# Patient Record
Sex: FEMALE | Race: WHITE | NOT HISPANIC OR LATINO | Employment: FULL TIME | URBAN - METROPOLITAN AREA
[De-identification: names, ages, dates, MRNs, and addresses within clinical notes are randomized per-mention and may not be internally consistent; named-entity substitution may affect disease eponyms.]

---

## 2017-01-03 ENCOUNTER — OFFICE VISIT (OUTPATIENT)
Dept: INTERNAL MEDICINE | Facility: CLINIC | Age: 37
End: 2017-01-03
Payer: COMMERCIAL

## 2017-01-03 ENCOUNTER — LAB VISIT (OUTPATIENT)
Dept: LAB | Facility: HOSPITAL | Age: 37
End: 2017-01-03
Attending: INTERNAL MEDICINE
Payer: COMMERCIAL

## 2017-01-03 VITALS
DIASTOLIC BLOOD PRESSURE: 78 MMHG | HEART RATE: 99 BPM | TEMPERATURE: 99 F | RESPIRATION RATE: 18 BRPM | BODY MASS INDEX: 24.61 KG/M2 | HEIGHT: 65 IN | SYSTOLIC BLOOD PRESSURE: 112 MMHG | WEIGHT: 147.69 LBS

## 2017-01-03 DIAGNOSIS — J06.9 UPPER RESPIRATORY TRACT INFECTION, UNSPECIFIED TYPE: ICD-10-CM

## 2017-01-03 DIAGNOSIS — J06.9 UPPER RESPIRATORY TRACT INFECTION, UNSPECIFIED TYPE: Primary | ICD-10-CM

## 2017-01-03 LAB
BASOPHILS # BLD AUTO: 0.03 K/UL
BASOPHILS NFR BLD: 0.4 %
DIFFERENTIAL METHOD: ABNORMAL
EOSINOPHIL # BLD AUTO: 0.1 K/UL
EOSINOPHIL NFR BLD: 1 %
ERYTHROCYTE [DISTWIDTH] IN BLOOD BY AUTOMATED COUNT: 13 %
HCT VFR BLD AUTO: 40.9 %
HGB BLD-MCNC: 13.8 G/DL
LYMPHOCYTES # BLD AUTO: 1 K/UL
LYMPHOCYTES NFR BLD: 14.4 %
MCH RBC QN AUTO: 29.2 PG
MCHC RBC AUTO-ENTMCNC: 33.7 %
MCV RBC AUTO: 87 FL
MONOCYTES # BLD AUTO: 0.8 K/UL
MONOCYTES NFR BLD: 11 %
NEUTROPHILS # BLD AUTO: 5 K/UL
NEUTROPHILS NFR BLD: 73.2 %
PLATELET # BLD AUTO: 283 K/UL
PMV BLD AUTO: 10.8 FL
RBC # BLD AUTO: 4.72 M/UL
WBC # BLD AUTO: 6.81 K/UL

## 2017-01-03 PROCEDURE — 85025 COMPLETE CBC W/AUTO DIFF WBC: CPT

## 2017-01-03 PROCEDURE — 36415 COLL VENOUS BLD VENIPUNCTURE: CPT | Mod: PO

## 2017-01-03 PROCEDURE — 99202 OFFICE O/P NEW SF 15 MIN: CPT | Mod: S$GLB,,, | Performed by: INTERNAL MEDICINE

## 2017-01-03 PROCEDURE — 1159F MED LIST DOCD IN RCRD: CPT | Mod: S$GLB,,, | Performed by: INTERNAL MEDICINE

## 2017-01-03 PROCEDURE — 99999 PR PBB SHADOW E&M-NEW PATIENT-LVL III: CPT | Mod: PBBFAC,,, | Performed by: INTERNAL MEDICINE

## 2017-01-03 RX ORDER — AZATHIOPRINE 75 MG/1
1 TABLET ORAL DAILY
Refills: 2 | COMMUNITY
Start: 2016-12-22 | End: 2017-04-18

## 2017-01-03 RX ORDER — NORGESTIMATE AND ETHINYL ESTRADIOL 7DAYSX3 28
KIT ORAL
Refills: 2 | COMMUNITY
Start: 2016-12-22

## 2017-01-03 RX ORDER — AMOXICILLIN 875 MG/1
875 TABLET, FILM COATED ORAL 2 TIMES DAILY
Qty: 20 TABLET | Refills: 0 | Status: SHIPPED | OUTPATIENT
Start: 2017-01-03 | End: 2017-01-13

## 2017-01-03 NOTE — MR AVS SNAPSHOT
[place] [facility]   [facility]  [place] [address]  Phone: [phone]  Fax: [phone]                  Lynda Reveles   1/3/2017 3:00 PM   Office Visit    Description:  Female : 1980   Provider:  Candice Miles MD   Department:  [place] [facility]           Reason for Visit     Fever     Cough     Sore Throat     Generalized Body Aches           Diagnoses this Visit        Comments    Upper respiratory tract infection, unspecified type    -  Primary            To Do List           Future Appointments        Provider Department Dept Phone    2017 2:00 PM Candice Miles MD [place] [facility] [phone]      Goals (5 Years of Data)     None       These Medications        Disp Refills Start End    amoxicillin (AMOXIL) 875 MG tablet 20 tablet 0 1/3/2017 2017    Take 1 tablet (875 mg total) by mouth 2 (two) times daily. - Oral    Pharmacy: [facility]/pharmacy #[id number] - [place] - [address]  #: [phone]         [facility]     [facility] On Call Nurse Care Line -  Assistance  Registered nurses in the [facility] On Call Center provide clinical advisement, health education, appointment booking, and other advisory services.  Call for this free service at [phone].             Medications           Message regarding Medications     Verify the changes and/or additions to your medication regime listed below are the same as discussed with your clinician today.  If any of these changes or additions are incorrect, please notify your healthcare provider.        START taking these NEW medications        Refills    amoxicillin (AMOXIL) 875 MG tablet 0    Sig: Take 1 tablet (875 mg total) by mouth 2 (two) times daily.    Class: Normal    Route: Oral           Verify that the below list of medications is an accurate representation of the medications you are currently taking.  If none reported, the list may be blank. If  "incorrect, please contact your healthcare provider. Carry this list with you in case of emergency.           Current Medications     amoxicillin (AMOXIL) 875 MG tablet Take 1 tablet (875 mg total) by mouth 2 (two) times daily.    AZASAN 75 mg Tab Take 1 tablet by mouth once daily.    norgestimate-ethinyl estradiol (ORTHO TRI-CYCLEN,TRI-SPRINTEC) 0.18/0.215/0.25 mg-35 mcg (28) tablet TAKE 1 TABLET BY MOUTH EVERY DAY MUST MAKE DR APPOINTMENT           Clinical Reference Information           Vital Signs - Last Recorded  Most recent update: 1/3/2017  3:00 PM by Briana Connell MA    BP Pulse Temp Resp Ht Wt    112/78 (BP Location: Left arm, Patient Position: Sitting, BP Method: Manual) 99 98.6 °F (37 °C) (Oral) 18 5' 5" (1.651 m) 67 kg (147 lb 11.3 oz)    LMP BMI             12/28/2016 24.58 kg/m2         Blood Pressure          Most Recent Value    BP  112/78      Allergies as of 1/3/2017     Asacol [Mesalamine]    Ciprofloxacin    Morphine      Immunizations Administered on Date of Encounter - 1/3/2017     None      Orders Placed During Today's Visit     Future Labs/Procedures Expected by Expires    CBC auto differential  1/3/2017 3/4/2018      "

## 2017-01-03 NOTE — PROGRESS NOTES
CC: fever and bodyache  HPI:  The patient is a 36 y.o. year old female who presents to the office for fever and bodyaches.  She reports a mild sore throat, ear pain and rhinorrhea recently.  She reports fatigue over the last week, difficulty running.  She reports aching sensation of spot on right shoulder blade.  The patient complains of fever Sunday.  She has taken tylenol.  She denies any dysuria or hematuria.  She also reports cough started recently.    PAST MEDICAL HISTORY:  Past Medical History   Diagnosis Date    Crohn disease        SURGICAL HISTORY:  Past Surgical History   Procedure Laterality Date    Subtotal colectomy       with ileostomy reversal       MEDS:  Medcard reviewed and updated    ALLERGIES: Allergy Card reviewed and updated    SOCIAL HISTORY:   The patient is a nonsmoker.    PE:   APPEARANCE: Well nourished, well developed, in no acute distress.    EARS: TM's intact. No retraction or perforation.    NOSE: Mucosa pink. Airway clear.  NODES: Positive right submandibular lymph node enlargement.  MOUTH & THROAT: No tonsillar enlargement. No pharyngeal erythema or exudate. No stridor.  CHEST: Lungs clear to auscultation with unlabored respirations.  CARDIOVASCULAR: Normal S1, S2. No murmurs. No carotid bruits. No pedal edema.  ABDOMEN: Bowel sounds normal. Not distended. Soft. No tenderness or masses.   PSYCHIATRIC: The patient is oriented to person, place, and time and has a pleasant affect.        ASSESSMENT/PLAN:  Lynda was seen today for fever, cough, sore throat and generalized body aches.    Diagnoses and all orders for this visit:    Upper respiratory tract infection, unspecified type  -     CBC auto differential; Future  -     Prescribed amoxicillin    Other orders  -     amoxicillin (AMOXIL) 875 MG tablet; Take 1 tablet (875 mg total) by mouth 2 (two) times daily.

## 2017-01-03 NOTE — LETTER
January 3, 2017      [place] [facility]  [address] [facility] [address] [facility]  [place] [address]  Phone: [phone]  Fax: [phone]       Patient: Lynda Reveles  YOB: 1980  Date of Visit: 01/03/2017    To Whom It May Concern:    Lynda Reveles was at [facility] on 01/03/2017. She may return to work/school on 01/05/2017 with no restrictions. If you have any questions or concerns, or if I can be of further assistance, please do not hesitate to contact me.    Sincerely,    Candice Miles MD

## 2017-01-06 ENCOUNTER — PATIENT MESSAGE (OUTPATIENT)
Dept: INTERNAL MEDICINE | Facility: CLINIC | Age: 37
End: 2017-01-06

## 2017-04-18 ENCOUNTER — OFFICE VISIT (OUTPATIENT)
Dept: INTERNAL MEDICINE | Facility: CLINIC | Age: 37
End: 2017-04-18
Payer: COMMERCIAL

## 2017-04-18 ENCOUNTER — LAB VISIT (OUTPATIENT)
Dept: LAB | Facility: HOSPITAL | Age: 37
End: 2017-04-18
Attending: INTERNAL MEDICINE
Payer: COMMERCIAL

## 2017-04-18 VITALS
HEART RATE: 80 BPM | TEMPERATURE: 98 F | BODY MASS INDEX: 24.21 KG/M2 | DIASTOLIC BLOOD PRESSURE: 76 MMHG | WEIGHT: 145.31 LBS | RESPIRATION RATE: 16 BRPM | HEIGHT: 65 IN | SYSTOLIC BLOOD PRESSURE: 102 MMHG

## 2017-04-18 DIAGNOSIS — J32.9 RECURRENT SINUSITIS: Primary | ICD-10-CM

## 2017-04-18 DIAGNOSIS — Z00.00 ROUTINE MEDICAL EXAM: ICD-10-CM

## 2017-04-18 DIAGNOSIS — Z00.00 ROUTINE MEDICAL EXAM: Primary | ICD-10-CM

## 2017-04-18 LAB
25(OH)D3+25(OH)D2 SERPL-MCNC: 42 NG/ML
ALBUMIN SERPL BCP-MCNC: 3.9 G/DL
ALP SERPL-CCNC: 53 U/L
ALT SERPL W/O P-5'-P-CCNC: 14 U/L
ANION GAP SERPL CALC-SCNC: 9 MMOL/L
AST SERPL-CCNC: 17 U/L
BASOPHILS # BLD AUTO: 0.03 K/UL
BASOPHILS NFR BLD: 0.5 %
BILIRUB SERPL-MCNC: 0.6 MG/DL
BUN SERPL-MCNC: 10 MG/DL
CALCIUM SERPL-MCNC: 9.5 MG/DL
CHLORIDE SERPL-SCNC: 102 MMOL/L
CHOLEST/HDLC SERPL: 3 {RATIO}
CO2 SERPL-SCNC: 26 MMOL/L
CREAT SERPL-MCNC: 0.8 MG/DL
DIFFERENTIAL METHOD: ABNORMAL
EOSINOPHIL # BLD AUTO: 0.1 K/UL
EOSINOPHIL NFR BLD: 1.5 %
ERYTHROCYTE [DISTWIDTH] IN BLOOD BY AUTOMATED COUNT: 13.9 %
EST. GFR  (AFRICAN AMERICAN): >60 ML/MIN/1.73 M^2
EST. GFR  (NON AFRICAN AMERICAN): >60 ML/MIN/1.73 M^2
GLUCOSE SERPL-MCNC: 88 MG/DL
HCT VFR BLD AUTO: 44.3 %
HDL/CHOLESTEROL RATIO: 33.9 %
HDLC SERPL-MCNC: 242 MG/DL
HDLC SERPL-MCNC: 82 MG/DL
HGB BLD-MCNC: 14.8 G/DL
LDLC SERPL CALC-MCNC: 138.8 MG/DL
LYMPHOCYTES # BLD AUTO: 1.3 K/UL
LYMPHOCYTES NFR BLD: 19.3 %
MCH RBC QN AUTO: 30.9 PG
MCHC RBC AUTO-ENTMCNC: 33.4 %
MCV RBC AUTO: 93 FL
MONOCYTES # BLD AUTO: 0.6 K/UL
MONOCYTES NFR BLD: 9.6 %
NEUTROPHILS # BLD AUTO: 4.5 K/UL
NEUTROPHILS NFR BLD: 68.9 %
NONHDLC SERPL-MCNC: 160 MG/DL
PLATELET # BLD AUTO: 362 K/UL
PMV BLD AUTO: 9.9 FL
POTASSIUM SERPL-SCNC: 4 MMOL/L
PROT SERPL-MCNC: 7.8 G/DL
RBC # BLD AUTO: 4.79 M/UL
SODIUM SERPL-SCNC: 137 MMOL/L
TRIGL SERPL-MCNC: 106 MG/DL
TSH SERPL DL<=0.005 MIU/L-ACNC: 2.15 UIU/ML
WBC # BLD AUTO: 6.47 K/UL

## 2017-04-18 PROCEDURE — 80061 LIPID PANEL: CPT

## 2017-04-18 PROCEDURE — 99999 PR PBB SHADOW E&M-EST. PATIENT-LVL III: CPT | Mod: PBBFAC,,, | Performed by: INTERNAL MEDICINE

## 2017-04-18 PROCEDURE — 85025 COMPLETE CBC W/AUTO DIFF WBC: CPT

## 2017-04-18 PROCEDURE — 99395 PREV VISIT EST AGE 18-39: CPT | Mod: S$GLB,,, | Performed by: INTERNAL MEDICINE

## 2017-04-18 PROCEDURE — 80053 COMPREHEN METABOLIC PANEL: CPT

## 2017-04-18 PROCEDURE — 82306 VITAMIN D 25 HYDROXY: CPT

## 2017-04-18 PROCEDURE — 84443 ASSAY THYROID STIM HORMONE: CPT

## 2017-04-18 PROCEDURE — 36415 COLL VENOUS BLD VENIPUNCTURE: CPT | Mod: PO

## 2017-04-18 RX ORDER — BUTALBITAL, ACETAMINOPHEN AND CAFFEINE 50; 325; 40 MG/1; MG/1; MG/1
TABLET ORAL
Refills: 0 | COMMUNITY
Start: 2017-02-01 | End: 2018-05-07

## 2017-04-18 RX ORDER — MERCAPTOPURINE 50 MG/1
50 TABLET ORAL DAILY
Refills: 2 | COMMUNITY
Start: 2017-04-03

## 2017-04-18 RX ORDER — ESCITALOPRAM OXALATE 10 MG/1
10 TABLET ORAL DAILY
Refills: 12 | COMMUNITY
Start: 2017-04-02

## 2017-04-18 NOTE — PROGRESS NOTES
The patient is a 36 y.o. old female who presents to the office for a physical.    PAST MEDICAL HISTORY  Past Medical History:   Diagnosis Date    Crohn disease        SURGICAL HISTORY:  Past Surgical History:   Procedure Laterality Date    SUBTOTAL COLECTOMY      with ileostomy reversal         MEDS:  Medcard reviewed and updated    ALLERGIES: Allergy Card reviewed and updated    SOCIAL HISTORY:   The patient is a nonsmoker, social alcohol, denies illicit drug use.    ROS:  GENERAL: No fever, chills, fatigability or weight loss.  SKIN: No rashes.  Positive scattered lesions.  HEAD: Occasional migraine headaches.  Denies recent head trauma.  EYES: No photophobia, ocular pain or diplopia.  EARS: Denies ear pain, discharge or vertigo.  NOSE: No epistaxis.  Positive postnasal drip.  MOUTH & THROAT: No hoarseness or change in voice.   NODES: Denies swollen glands.  CHEST: Denies shortness of breath or wheezing.  Positive recent cough.  CARDIOVASCULAR: Denies chest pain or palpitations.  ABDOMEN: Appetite fine. Denies diarrhea, abdominal pain, constipation or blood in stool.  URINARY: No dysuria or hematuria.  MUSCULOSKELETAL: No joint stiffness or swelling. Denies back pain.  NEUROLOGIC: No history of seizures.  ENDOCRINE: Denies polyuria or polydipsia.  PSYCHIATRIC: Denies mood swings, depression, homicidal or suicidal thoughts.  Positive anxiety.    SCREENINGS:  Last cholesterol: unknown  Last colonoscopy: 2-1/2 years ago  Last mammogram: none  Last Pap smear: 2017  Last tetanus: unknown  Last Pneumovax: none  Last eye exam: unknown  Last bone density: several years ago  Last menstrual period: April 18, 2017    PE:   Vitals:  Vitals:    04/18/17 0857   BP: 102/76   Pulse: 80   Resp: 16   Temp: 98.4 °F (36.9 °C)       APPEARANCE: Well nourished, well developed, in no acute distress.    EYES: Sclerae anicteric. PERRL. EOMI.      EARS: TM's intact. No retraction or perforation.    NOSE: Mucosa pink. Airway clear.  MOUTH  & THROAT: No tonsillar enlargement. No pharyngeal erythema or exudate. No stridor.  NECK: Supple, no thyromegaly.  CHEST: Lungs clear to auscultation with unlabored respirations.  CARDIOVASCULAR: Normal S1, S2. No murmurs. No carotid bruits. No pedal edema.  ABDOMEN: Bowel sounds normal. Not distended. Soft. No tenderness or masses. No organomegaly.  MUSCULOSKELETAL:  Normal gait, no cyanosis or clubbing.   SKIN: Normal skin turgor, warm and dry.  NEUROLOGIC: Cranial Nerves: Intact.  PSYCHIATRIC: The patient is oriented to person, place, and time and has a pleasant affect.        ASSESSMENT/PLAN:  Lynda was seen today for annual exam.    Diagnoses and all orders for this visit:    Routine medical exam  -     CBC auto differential; Future  -     Comprehensive metabolic panel; Future  -     Lipid panel; Future  -     TSH; Future  -     Urinalysis; Future  -     Vitamin D; Future

## 2017-04-18 NOTE — MR AVS SNAPSHOT
[place] [facility]   [facility]  [place] [address]  Phone: [phone]  Fax: [phone]                  Lynda Reveles   2017 9:00 AM   Office Visit    Description:  Female : 1980   Provider:  Candice Miles MD   Department:  [facility]           Reason for Visit     Annual Exam           Diagnoses this Visit        Comments    Routine medical exam    -  Primary            To Do List           Goals (5 Years of Data)     None      Follow-Up and Disposition     Return in about 1 year (around 2018).      [facility] On Call     [facility] On Call Nurse Care Line -  Assistance  Unless otherwise directed by your provider, please contact [facility], our nurse care line that is available for  assistance.     Registered nurses in the [facility] On Call Center provide: appointment scheduling, clinical advisement, health education, and other advisory services.  Call: [phone] (toll free)               Medications           Message regarding Medications     Verify the changes and/or additions to your medication regime listed below are the same as discussed with your clinician today.  If any of these changes or additions are incorrect, please notify your healthcare provider.        STOP taking these medications     AZASAN 75 mg Tab Take 1 tablet by mouth once daily.           Verify that the below list of medications is an accurate representation of the medications you are currently taking.  If none reported, the list may be blank. If incorrect, please contact your healthcare provider. Carry this list with you in case of emergency.           Current Medications     butalbital-acetaminophen-caffeine -40 mg (FIORICET, ESGIC) -40 mg per tablet TAKE 1 TABLET BY MOUTH EVERY 4HOURS AS NEEDED    escitalopram oxalate (LEXAPRO) 10 MG tablet Take 10 mg by mouth once daily.    mercaptopurine (PURINETHOL) 50 mg tablet Take 50 mg by mouth once  "daily.    norgestimate-ethinyl estradiol (ORTHO TRI-CYCLEN,TRI-SPRINTEC) 0.18/0.215/0.25 mg-35 mcg (28) tablet TAKE 1 TABLET BY MOUTH EVERY DAY MUST MAKE DR APPOINTMENT           Clinical Reference Information           Your Vitals Were     BP Pulse Temp Resp Height Weight    102/76 (BP Location: Left arm, Patient Position: Sitting, BP Method: Manual) 80 98.4 °F (36.9 °C) (Oral) 16 5' 5" (1.651 m) 65.9 kg (145 lb 4.5 oz)    Last Period BMI             04/18/2017 24.18 kg/m2         Blood Pressure          Most Recent Value    BP  102/76      Allergies as of 4/18/2017     Asacol [Mesalamine]    Ciprofloxacin    Morphine      Immunizations Administered on Date of Encounter - 4/18/2017     None      Orders Placed During Today's Visit     Future Labs/Procedures Expected by Expires    CBC auto differential  4/18/2017 6/17/2018    Comprehensive metabolic panel  4/18/2017 6/17/2018    Lipid panel  4/18/2017 4/18/2018    TSH  4/18/2017 6/17/2018    Urinalysis  4/18/2017 4/18/2018    Vitamin D  4/18/2017 4/18/2018      Language Assistance Services     ATTENTION: Language assistance services are available, free of charge. Please call [phone].      ATENCIÓN: Si habla sissyañol, tiene a dawn disposición servicios gratuitos de asistencia lingüística. Llame al [phone].     CHÚ Ý: N?u b?n nói Ti?ng Vi?t, có các d?ch v? h? tr? ngôn ng? mi?n phí dành cho b?n. G?i s? [phone].         [place] [facility] complies with applicable Federal civil rights laws and does not discriminate on the basis of race, color, national origin, age, disability, or sex.        "

## 2017-05-12 ENCOUNTER — OFFICE VISIT (OUTPATIENT)
Dept: ALLERGY | Facility: CLINIC | Age: 37
End: 2017-05-12
Payer: COMMERCIAL

## 2017-05-12 ENCOUNTER — LAB VISIT (OUTPATIENT)
Dept: LAB | Facility: HOSPITAL | Age: 37
End: 2017-05-12
Attending: ALLERGY & IMMUNOLOGY
Payer: COMMERCIAL

## 2017-05-12 VITALS
HEIGHT: 65 IN | BODY MASS INDEX: 24.75 KG/M2 | OXYGEN SATURATION: 99 % | SYSTOLIC BLOOD PRESSURE: 96 MMHG | DIASTOLIC BLOOD PRESSURE: 68 MMHG | HEART RATE: 83 BPM | WEIGHT: 148.56 LBS

## 2017-05-12 DIAGNOSIS — H10.423 SIMPLE CHRONIC CONJUNCTIVITIS OF BOTH EYES: ICD-10-CM

## 2017-05-12 DIAGNOSIS — J31.0 RHINITIS, CHRONIC: Primary | ICD-10-CM

## 2017-05-12 DIAGNOSIS — J32.9 RECURRENT SINUS INFECTIONS: ICD-10-CM

## 2017-05-12 DIAGNOSIS — J31.0 RHINITIS, CHRONIC: ICD-10-CM

## 2017-05-12 LAB
IGA SERPL-MCNC: 194 MG/DL
IGE SERPL-ACNC: <35 IU/ML
IGG SERPL-MCNC: 1270 MG/DL
IGM SERPL-MCNC: 140 MG/DL

## 2017-05-12 PROCEDURE — 82787 IGG 1 2 3 OR 4 EACH: CPT | Mod: 59

## 2017-05-12 PROCEDURE — 86003 ALLG SPEC IGE CRUDE XTRC EA: CPT

## 2017-05-12 PROCEDURE — 82784 ASSAY IGA/IGD/IGG/IGM EACH: CPT | Mod: 59

## 2017-05-12 PROCEDURE — 86003 ALLG SPEC IGE CRUDE XTRC EA: CPT | Mod: 59

## 2017-05-12 PROCEDURE — 82784 ASSAY IGA/IGD/IGG/IGM EACH: CPT

## 2017-05-12 PROCEDURE — 82785 ASSAY OF IGE: CPT

## 2017-05-12 PROCEDURE — 99244 OFF/OP CNSLTJ NEW/EST MOD 40: CPT | Mod: S$GLB,,, | Performed by: ALLERGY & IMMUNOLOGY

## 2017-05-12 PROCEDURE — 99999 PR PBB SHADOW E&M-EST. PATIENT-LVL III: CPT | Mod: PBBFAC,,, | Performed by: ALLERGY & IMMUNOLOGY

## 2017-05-12 PROCEDURE — 86317 IMMUNOASSAY INFECTIOUS AGENT: CPT

## 2017-05-12 NOTE — LETTER
May 12, 2017      Candice Miles MD  [address] [facility] [address]  [place] [address]           [place] [facility]  [address] [facility]  [place] [address]  Phone: [phone]          Patient: Lynda Reveles   MR Number: [id number]   YOB: 1980   Date of Visit: 5/12/2017       Dear Dr. Candice Miles:    Thank you for referring Lynda Reveles to me for evaluation. Attached you will find relevant portions of my assessment and plan of care.    If you have questions, please do not hesitate to call me. I look forward to following Lynda Reveles along with you.    Sincerely,    Homa Otero MD    Enclosure  CC:  No Recipients    If you would like to receive this communication electronically, please contact [email or web address] or [phone] to request more information on [employer] Link access.    For providers and/or their staff who would like to refer a patient to [facility], please contact us through our one-stop-shop provider referral line, Vaibhav Mattson, at [phone].    If you feel you have received this communication in error or would no longer like to receive these types of communications, please e-mail [email or web address]

## 2017-05-12 NOTE — PROGRESS NOTES
Subjective:       Patient ID: Lynda Reveles is a 36 y.o. female.    Chief Complaint:  Allergies (Dr Miles referred for recurrent sinus infections, allergies)      HPI Comments: 35 yo woman presents for consult from Dr Candice Miles for recurrent infections. She states she has recurrent respiratory infections. She has had 3 since August - August, January and April. She has daily watery eyes, clear runny nose, occ scratchy throat and some sneeze. Tends to be worse when wakes up and then improves some in day. Has daily but seems worse last few months so maybe spring worse. Not much congestion. No triggers except smoke causes headaches. Then seems to turn to infections with more congestion, green mucus. In January had bad fever and chills. Also get cough. No SOB or wheeze. She has no asthma. She had one episode 7-8 years ago told was eczema and she does have dry skin. No known food, insect or latex allergy. She does not take any allergy medications. She does have Crohn disease and had colectomy and reversal on 2000, no there medical issues, no sinus surgery.       Environmental History: see history section for home environment  Review of Systems   Constitutional: Negative for appetite change, chills, fatigue and fever.   HENT: Positive for postnasal drip, rhinorrhea, sinus pressure, sneezing and sore throat. Negative for congestion, ear discharge, ear pain, facial swelling, nosebleeds, trouble swallowing and voice change.    Eyes: Negative for discharge, redness, itching and visual disturbance.   Respiratory: Positive for cough. Negative for choking, chest tightness, shortness of breath and wheezing.    Cardiovascular: Negative for chest pain, palpitations and leg swelling.   Gastrointestinal: Negative for abdominal distention, abdominal pain, constipation, diarrhea, nausea and vomiting.   Genitourinary: Negative for difficulty urinating.   Musculoskeletal: Negative for arthralgias, gait problem, joint swelling and  myalgias.   Skin: Negative for color change and rash.   Neurological: Negative for dizziness, syncope, weakness, light-headedness and headaches.   Hematological: Negative for adenopathy. Does not bruise/bleed easily.   Psychiatric/Behavioral: Negative for agitation, behavioral problems, confusion and sleep disturbance. The patient is not nervous/anxious.         Objective:    Physical Exam   Constitutional: She is oriented to person, place, and time. She appears well-developed and well-nourished. No distress.   HENT:   Head: Normocephalic and atraumatic.   Right Ear: Hearing, tympanic membrane, external ear and ear canal normal.   Left Ear: Hearing, tympanic membrane, external ear and ear canal normal.   Nose: No mucosal edema, rhinorrhea, sinus tenderness or septal deviation. No epistaxis. Right sinus exhibits no maxillary sinus tenderness and no frontal sinus tenderness. Left sinus exhibits no maxillary sinus tenderness and no frontal sinus tenderness.   Mouth/Throat: Uvula is midline, oropharynx is clear and moist and mucous membranes are normal. No uvula swelling.   Eyes: Conjunctivae are normal. Right eye exhibits no discharge. Left eye exhibits no discharge.   Neck: Normal range of motion. No thyromegaly present.   Cardiovascular: Normal rate, regular rhythm and normal heart sounds.    No murmur heard.  Pulmonary/Chest: Effort normal and breath sounds normal. No respiratory distress. She has no wheezes.   Abdominal: Soft. She exhibits no distension. There is no tenderness.   Musculoskeletal: Normal range of motion. She exhibits no edema or tenderness.   Lymphadenopathy:     She has no cervical adenopathy.   Neurological: She is alert and oriented to person, place, and time.   Skin: Skin is warm and dry. No rash noted. No erythema.   Psychiatric: She has a normal mood and affect. Her behavior is normal. Judgment and thought content normal.   Nursing note and vitals reviewed.      Laboratory:   none performed    Assessment:       1. Rhinitis, chronic    2. Simple chronic conjunctivitis of both eyes    3. Recurrent sinus infections         Plan:       1. Labs today for immunocaps and immune system  2. Trial oral antihistamine daily  3. Phone review  4. Dr Miles notified of completed consult via [employer]

## 2017-05-15 LAB
A ALTERNATA IGE QN: <0.35 KU/L
A FUMIGATUS IGE QN: <0.35 KU/L
ALLERGEN MAPLE/SYCAMORE IGE: <0.35 KU/L
ALLERGEN PENICILLIUM IGE: <0.35 KU/L
ALLERGEN WHITE PINE TREE IGE: <0.35 KU/L
ALLERGEN WILLOW IGE: <0.35 KU/L
BAHIA GRASS IGE QN: <0.35 KU/L
BALD CYPRESS IGE QN: <0.35 KU/L
BERMUDA GRASS IGE QN: <0.35 KU/L
C GLOBOSUM IGE QN: <0.35 KU/L
C HERBARUM IGE QN: <0.35 KU/L
C LUNATA IGE QN: <0.35 KU/L
CAT DANDER IGE QN: <0.35 KU/L
COMMON RAGWEED IGE QN: <0.35 KU/L
D FARINAE IGE QN: <0.35 KU/L
D PTERONYSS IGE QN: <0.35 KU/L
DEPRECATED A ALTERNATA IGE RAST QL: NORMAL
DEPRECATED A FUMIGATUS IGE RAST QL: NORMAL
DEPRECATED BAHIA GRASS IGE RAST QL: NORMAL
DEPRECATED BALD CYPRESS IGE RAST QL: NORMAL
DEPRECATED BERMUDA GRASS IGE RAST QL: NORMAL
DEPRECATED C GLOBOSUM IGE RAST QL: NORMAL
DEPRECATED C HERBARUM IGE RAST QL: NORMAL
DEPRECATED C LUNATA IGE RAST QL: NORMAL
DEPRECATED CAT DANDER IGE RAST QL: NORMAL
DEPRECATED COMMON RAGWEED IGE RAST QL: NORMAL
DEPRECATED D FARINAE IGE RAST QL: NORMAL
DEPRECATED D PTERONYSS IGE RAST QL: NORMAL
DEPRECATED DOG DANDER IGE RAST QL: NORMAL
DEPRECATED ENGL PLANTAIN IGE RAST QL: NORMAL
DEPRECATED JOHNSON GRASS IGE RAST QL: NORMAL
DEPRECATED MARSH ELDER IGE RAST QL: NORMAL
DEPRECATED MUGWORT IGE RAST QL: NORMAL
DEPRECATED PECAN/HICK TREE IGE RAST QL: NORMAL
DEPRECATED ROACH IGE RAST QL: NORMAL
DEPRECATED S ROSTRATA IGE RAST QL: NORMAL
DEPRECATED SALTWORT IGE RAST QL: NORMAL
DEPRECATED SILVER BIRCH IGE RAST QL: NORMAL
DEPRECATED TIMOTHY IGE RAST QL: NORMAL
DEPRECATED WHITE OAK IGE RAST QL: NORMAL
DOG DANDER IGE QN: <0.35 KU/L
ENGL PLANTAIN IGE QN: <0.35 KU/L
IGG1 SER-MCNC: 774 MG/DL
IGG2 SER-MCNC: 381 MG/DL
IGG3 SER-MCNC: 33 MG/DL
IGG4 SER-MCNC: 50 MG/DL
JOHNSON GRASS IGE QN: <0.35 KU/L
MAPLE/SYCAMORE CLASS: NORMAL
MARSH ELDER IGE QN: <0.35 KU/L
MUGWORT IGE QN: <0.35 KU/L
PECAN/HICK TREE IGE QN: <0.35 KU/L
PENICILLIUM CLASS: NORMAL
RAGWEED, WESTERN IGE: <0.35 KU/L
RAGWEED, WESTERN, CLASS: NORMAL
ROACH IGE QN: <0.35 KU/L
S ROSTRATA IGE QN: <0.35 KU/L
SALTWORT IGE QN: <0.35 KU/L
SILVER BIRCH IGE QN: <0.35 KU/L
TIMOTHY IGE QN: <0.35 KU/L
WHITE OAK IGE QN: <0.35 KU/L
WHITE PINE CLASS: NORMAL
WILLOW CLASS: NORMAL

## 2017-05-17 ENCOUNTER — TELEPHONE (OUTPATIENT)
Dept: ALLERGY | Facility: CLINIC | Age: 37
End: 2017-05-17

## 2017-05-17 LAB
C DIPHTHERIAE AB SER IA-ACNC: 0.22 IU/ML
C TETANI AB SER-ACNC: 1.14 IU/ML
DEPRECATED S PNEUM 1 IGG SER-MCNC: <0.3 MCG/ML
DEPRECATED S PNEUM12 IGG SER-MCNC: <0.3 MCG/ML
DEPRECATED S PNEUM14 IGG SER-MCNC: 1.5 MCG/ML
DEPRECATED S PNEUM19 IGG SER-MCNC: <0.3 MCG/ML
DEPRECATED S PNEUM23 IGG SER-MCNC: 1.1 MCG/ML
DEPRECATED S PNEUM3 IGG SER-MCNC: <0.3 MCG/ML
DEPRECATED S PNEUM4 IGG SER-MCNC: 0.4 MCG/ML
DEPRECATED S PNEUM5 IGG SER-MCNC: <0.3 MCG/ML
DEPRECATED S PNEUM8 IGG SER-MCNC: 0.4 MCG/ML
DEPRECATED S PNEUM9 IGG SER-MCNC: <0.3 MCG/ML
HAEM INFLU B IGG SER-MCNC: 0.44 MG/L
S PNEUM DA 18C IGG SER-MCNC: <0.3 MCG/ML
S PNEUM DA 6B IGG SER-MCNC: <0.3 MCG/ML
S PNEUM DA 7F IGG SER-MCNC: <0.3 MCG/ML
S PNEUM DA 9V IGG SER-MCNC: 0.5 MCG/ML

## 2017-05-17 NOTE — TELEPHONE ENCOUNTER
Please let patient know all allergy tests were negative.  Immune system tests are good except not adequately protected against strep pneumoniae.  Recommend pneumovax and repeat labs.

## 2018-01-24 ENCOUNTER — TELEPHONE (OUTPATIENT)
Dept: INTERNAL MEDICINE | Facility: CLINIC | Age: 38
End: 2018-01-24

## 2018-01-24 DIAGNOSIS — Z00.00 ROUTINE GENERAL MEDICAL EXAMINATION AT A HEALTH CARE FACILITY: Primary | ICD-10-CM

## 2018-02-05 ENCOUNTER — PATIENT MESSAGE (OUTPATIENT)
Dept: INTERNAL MEDICINE | Facility: CLINIC | Age: 38
End: 2018-02-05

## 2018-05-02 ENCOUNTER — LAB VISIT (OUTPATIENT)
Dept: LAB | Facility: HOSPITAL | Age: 38
End: 2018-05-02
Attending: INTERNAL MEDICINE
Payer: COMMERCIAL

## 2018-05-02 DIAGNOSIS — Z00.00 ROUTINE GENERAL MEDICAL EXAMINATION AT A HEALTH CARE FACILITY: ICD-10-CM

## 2018-05-02 LAB
25(OH)D3+25(OH)D2 SERPL-MCNC: 48 NG/ML
ALBUMIN SERPL BCP-MCNC: 3.6 G/DL
ALP SERPL-CCNC: 45 U/L
ALT SERPL W/O P-5'-P-CCNC: 11 U/L
ANION GAP SERPL CALC-SCNC: 8 MMOL/L
AST SERPL-CCNC: 15 U/L
BASOPHILS # BLD AUTO: 0.05 K/UL
BASOPHILS NFR BLD: 1.1 %
BILIRUB SERPL-MCNC: 0.8 MG/DL
BUN SERPL-MCNC: 13 MG/DL
CALCIUM SERPL-MCNC: 9.2 MG/DL
CHLORIDE SERPL-SCNC: 105 MMOL/L
CHOLEST SERPL-MCNC: 176 MG/DL
CHOLEST/HDLC SERPL: 2.9 {RATIO}
CO2 SERPL-SCNC: 24 MMOL/L
CREAT SERPL-MCNC: 0.8 MG/DL
DIFFERENTIAL METHOD: ABNORMAL
EOSINOPHIL # BLD AUTO: 0.1 K/UL
EOSINOPHIL NFR BLD: 1.5 %
ERYTHROCYTE [DISTWIDTH] IN BLOOD BY AUTOMATED COUNT: 13.6 %
EST. GFR  (AFRICAN AMERICAN): >60 ML/MIN/1.73 M^2
EST. GFR  (NON AFRICAN AMERICAN): >60 ML/MIN/1.73 M^2
GLUCOSE SERPL-MCNC: 86 MG/DL
HCT VFR BLD AUTO: 36.6 %
HDLC SERPL-MCNC: 60 MG/DL
HDLC SERPL: 34.1 %
HGB BLD-MCNC: 11.9 G/DL
IMM GRANULOCYTES # BLD AUTO: 0.02 K/UL
IMM GRANULOCYTES NFR BLD AUTO: 0.4 %
LDLC SERPL CALC-MCNC: 101 MG/DL
LYMPHOCYTES # BLD AUTO: 1.3 K/UL
LYMPHOCYTES NFR BLD: 27.7 %
MCH RBC QN AUTO: 31.6 PG
MCHC RBC AUTO-ENTMCNC: 32.5 G/DL
MCV RBC AUTO: 97 FL
MONOCYTES # BLD AUTO: 0.4 K/UL
MONOCYTES NFR BLD: 7.6 %
NEUTROPHILS # BLD AUTO: 2.8 K/UL
NEUTROPHILS NFR BLD: 61.7 %
NONHDLC SERPL-MCNC: 116 MG/DL
NRBC BLD-RTO: 0 /100 WBC
PLATELET # BLD AUTO: 231 K/UL
PMV BLD AUTO: 10.9 FL
POTASSIUM SERPL-SCNC: 3.7 MMOL/L
PROT SERPL-MCNC: 6.7 G/DL
RBC # BLD AUTO: 3.77 M/UL
SODIUM SERPL-SCNC: 137 MMOL/L
TRIGL SERPL-MCNC: 75 MG/DL
TSH SERPL DL<=0.005 MIU/L-ACNC: 1.22 UIU/ML
WBC # BLD AUTO: 4.59 K/UL

## 2018-05-02 PROCEDURE — 80053 COMPREHEN METABOLIC PANEL: CPT

## 2018-05-02 PROCEDURE — 80061 LIPID PANEL: CPT

## 2018-05-02 PROCEDURE — 82306 VITAMIN D 25 HYDROXY: CPT

## 2018-05-02 PROCEDURE — 85025 COMPLETE CBC W/AUTO DIFF WBC: CPT

## 2018-05-02 PROCEDURE — 83036 HEMOGLOBIN GLYCOSYLATED A1C: CPT

## 2018-05-02 PROCEDURE — 36415 COLL VENOUS BLD VENIPUNCTURE: CPT | Mod: PO

## 2018-05-02 PROCEDURE — 84443 ASSAY THYROID STIM HORMONE: CPT

## 2018-05-03 LAB
ESTIMATED AVG GLUCOSE: 94 MG/DL
HBA1C MFR BLD HPLC: 4.9 %

## 2018-05-07 ENCOUNTER — LAB VISIT (OUTPATIENT)
Dept: LAB | Facility: HOSPITAL | Age: 38
End: 2018-05-07
Attending: INTERNAL MEDICINE
Payer: COMMERCIAL

## 2018-05-07 ENCOUNTER — OFFICE VISIT (OUTPATIENT)
Dept: INTERNAL MEDICINE | Facility: CLINIC | Age: 38
End: 2018-05-07
Payer: COMMERCIAL

## 2018-05-07 VITALS
SYSTOLIC BLOOD PRESSURE: 93 MMHG | DIASTOLIC BLOOD PRESSURE: 63 MMHG | RESPIRATION RATE: 14 BRPM | TEMPERATURE: 98 F | HEIGHT: 65 IN | WEIGHT: 147.5 LBS | HEART RATE: 61 BPM | BODY MASS INDEX: 24.57 KG/M2

## 2018-05-07 DIAGNOSIS — Z23 NEED FOR VACCINATION WITH 13-POLYVALENT PNEUMOCOCCAL CONJUGATE VACCINE: ICD-10-CM

## 2018-05-07 DIAGNOSIS — Z00.00 ROUTINE GENERAL MEDICAL EXAMINATION AT A HEALTH CARE FACILITY: ICD-10-CM

## 2018-05-07 DIAGNOSIS — Z00.00 ROUTINE GENERAL MEDICAL EXAMINATION AT A HEALTH CARE FACILITY: Primary | ICD-10-CM

## 2018-05-07 LAB
FOLATE SERPL-MCNC: 10.6 NG/ML
IRON SERPL-MCNC: 119 UG/DL
SATURATED IRON: 31 %
TOTAL IRON BINDING CAPACITY: 386 UG/DL
TRANSFERRIN SERPL-MCNC: 261 MG/DL
VIT B12 SERPL-MCNC: 284 PG/ML

## 2018-05-07 PROCEDURE — 36415 COLL VENOUS BLD VENIPUNCTURE: CPT | Mod: PO

## 2018-05-07 PROCEDURE — 99395 PREV VISIT EST AGE 18-39: CPT | Mod: 25,S$GLB,, | Performed by: INTERNAL MEDICINE

## 2018-05-07 PROCEDURE — 90471 IMMUNIZATION ADMIN: CPT | Mod: S$GLB,,, | Performed by: INTERNAL MEDICINE

## 2018-05-07 PROCEDURE — 83540 ASSAY OF IRON: CPT

## 2018-05-07 PROCEDURE — 90670 PCV13 VACCINE IM: CPT | Mod: S$GLB,,, | Performed by: INTERNAL MEDICINE

## 2018-05-07 PROCEDURE — 82728 ASSAY OF FERRITIN: CPT

## 2018-05-07 PROCEDURE — 99999 PR PBB SHADOW E&M-EST. PATIENT-LVL III: CPT | Mod: PBBFAC,,, | Performed by: INTERNAL MEDICINE

## 2018-05-07 PROCEDURE — 82746 ASSAY OF FOLIC ACID SERUM: CPT

## 2018-05-07 PROCEDURE — 82607 VITAMIN B-12: CPT

## 2018-05-07 NOTE — PROGRESS NOTES
The patient is a 37 y.o. old female who presents to the office for a physical.  Review of labs reveals mild anemia.    PAST MEDICAL HISTORY  Past Medical History:   Diagnosis Date    Crohn disease        SURGICAL HISTORY:  Past Surgical History:   Procedure Laterality Date    SUBTOTAL COLECTOMY      with ileostomy reversal         MEDS:  Medcard reviewed and updated    ALLERGIES: Allergy Card reviewed and updated    SOCIAL HISTORY:   The patient is a nonsmoker, denies alcohol or illicit drug use.    ROS:  GENERAL: No fever, chills, fatigability or weight loss.  SKIN: No rashes.  HEAD: Occasional migraine headaches.  Denies recent head trauma.  EYES: No photophobia, ocular pain or diplopia.  EARS: Denies ear pain, discharge or vertigo.  NOSE: No epistaxis or postnasal drip.  MOUTH & THROAT: No hoarseness or change in voice.   NODES: Denies swollen glands.  CHEST: Denies shortness of breath, wheezing, cough and sputum production.  CARDIOVASCULAR: Denies chest pain or palpitations.  ABDOMEN: Appetite fine. Denies abdominal pain, constipation or blood in stool.  Positive diarrhea.  URINARY: No dysuria or hematuria.  MUSCULOSKELETAL: No joint stiffness or swelling. Denies back pain.  NEUROLOGIC: No history of seizures.  ENDOCRINE: Denies polyuria or polydipsia.  PSYCHIATRIC: Denies mood swings, depression, anxiety, homicidal or suicidal thoughts.    SCREENINGS:  Last cholesterol: 2018  Last sigmoidoscopy: 2-3 years ago  Last mammogram: 2018@   Last Pap smear: 2017  Last tetanus: unknown  Last Pneumovax: none  Last eye exam: 2017, wears glasses  Last bone density: unknown  Last menstrual period: April 18, 2018    PE:   Vitals:  Vitals:    05/07/18 1305   BP: 93/63   Pulse: 61   Resp: 14   Temp: 98.4 °F (36.9 °C)       APPEARANCE: Well nourished, well developed, in no acute distress.    EYES: Sclerae anicteric. PERRL. EOMI.      EARS: TM's intact. No retraction or perforation.    NOSE: Mucosa pink. Airway clear.  MOUTH  & THROAT: No tonsillar enlargement. No pharyngeal erythema or exudate. No stridor.  NECK: Supple, no thyromegaly.  CHEST: Lungs clear to auscultation with unlabored respirations.  CARDIOVASCULAR: Normal S1, S2. No murmurs. No carotid bruits. No pedal edema.  ABDOMEN: Bowel sounds normal. Not distended. Soft. No tenderness or masses. No organomegaly.  MUSCULOSKELETAL:  Normal gait, no cyanosis or clubbing.   SKIN: Normal skin turgor, warm and dry.  NEUROLOGIC: Cranial Nerves: Intact.  PSYCHIATRIC: The patient is oriented to person, place, and time and has a pleasant affect.        ASSESSMENT/PLAN:  Lynda was seen today for annual exam.    Diagnoses and all orders for this visit:    Routine general medical examination at a health care facility  -     Ferritin; Future  -     Iron and TIBC; Future  -     Vitamin B12; Future  -     Folate; Future  -      Labs reviewed, mild anemia    Need for vaccination with 13-polyvalent pneumococcal conjugate vaccine  -     (In Office Administered) Pneumococcal Conjugate Vaccine (13 Valent) (IM)          Answers for HPI/ROS submitted by the patient on 5/5/2018   activity change: No  unexpected weight change: No  neck pain: No  hearing loss: No  rhinorrhea: Yes  trouble swallowing: No  eye discharge: No  visual disturbance: No  chest tightness: No  wheezing: No  chest pain: No  palpitations: No  blood in stool: No  constipation: No  vomiting: No  diarrhea: Yes  polydipsia: No  polyuria: No  difficulty urinating: No  hematuria: No  menstrual problem: No  dysuria: No  joint swelling: No  arthralgias: Yes  headaches: Yes  weakness: No  confusion: No  dysphoric mood: No

## 2018-05-08 LAB — FERRITIN SERPL-MCNC: 24 NG/ML

## 2018-07-15 ENCOUNTER — HOSPITAL ENCOUNTER (EMERGENCY)
Facility: HOSPITAL | Age: 38
Discharge: HOME | End: 2018-07-15
Attending: FAMILY MEDICINE
Payer: COMMERCIAL

## 2018-07-15 VITALS
TEMPERATURE: 97 F | HEIGHT: 65 IN | DIASTOLIC BLOOD PRESSURE: 72 MMHG | HEART RATE: 65 BPM | BODY MASS INDEX: 23.99 KG/M2 | RESPIRATION RATE: 18 BRPM | WEIGHT: 144 LBS | OXYGEN SATURATION: 100 % | SYSTOLIC BLOOD PRESSURE: 111 MMHG
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
## 2018-07-15 PROCEDURE — 90715 TDAP VACCINE 7 YRS/> IM: CPT | Performed by: FAMILY MEDICINE

## 2018-07-15 PROCEDURE — 63600175 PHARM REV CODE 636 W HCPCS: Performed by: FAMILY MEDICINE

## 2018-07-15 PROCEDURE — 90471 IMMUNIZATION ADMIN: CPT | Performed by: FAMILY MEDICINE

## 2018-07-15 PROCEDURE — 96372 THER/PROPH/DIAG INJ SC/IM: CPT

## 2018-07-15 PROCEDURE — 25000003 PHARM REV CODE 250: Performed by: FAMILY MEDICINE

## 2018-07-15 PROCEDURE — 99283 EMERGENCY DEPT VISIT LOW MDM: CPT | Mod: 25

## 2018-07-15 RX ORDER — CEFTRIAXONE 1 G/1
1 INJECTION, POWDER, FOR SOLUTION INTRAMUSCULAR; INTRAVENOUS
Status: COMPLETED | OUTPATIENT
Start: 2018-07-15 | End: 2018-07-15

## 2018-07-15 RX ORDER — AMOXICILLIN AND CLAVULANATE POTASSIUM 875; 125 MG/1; MG/1
1 TABLET, FILM COATED ORAL 2 TIMES DAILY
Qty: 14 TABLET | Refills: 0 | Status: SHIPPED | OUTPATIENT
Start: 2018-07-15 | End: 2021-10-30 | Stop reason: ALTCHOICE

## 2018-07-15 RX ADMIN — CEFTRIAXONE SODIUM 1 G: 1 INJECTION, POWDER, FOR SOLUTION INTRAMUSCULAR; INTRAVENOUS at 02:07

## 2018-07-15 RX ADMIN — BACITRACIN, NEOMYCIN, POLYMYXIN B 1 EACH: 400; 3.5; 5 OINTMENT TOPICAL at 02:07

## 2018-07-15 RX ADMIN — CLOSTRIDIUM TETANI TOXOID ANTIGEN (FORMALDEHYDE INACTIVATED), CORYNEBACTERIUM DIPHTHERIAE TOXOID ANTIGEN (FORMALDEHYDE INACTIVATED), BORDETELLA PERTUSSIS TOXOID ANTIGEN (GLUTARALDEHYDE INACTIVATED), BORDETELLA PERTUSSIS FILAMENTOUS HEMAGGLUTININ ANTIGEN (FORMALDEHYDE INACTIVATED), BORDETELLA PERTUSSIS PERTACTIN ANTIGEN, AND BORDETELLA PERTUSSIS FIMBRIAE 2/3 ANTIGEN 0.5 ML: 5; 2; 2.5; 5; 3; 5 INJECTION, SUSPENSION INTRAMUSCULAR at 02:07

## 2018-07-15 NOTE — ED PROVIDER NOTES
"Encounter Date: 7/15/2018       History     Chief Complaint   Patient presents with    Animal Bite     Pt states was bitten and scratched by family dog this am, + abrasions and scratches noted to right side of face, + puncture wound noted to right forearm.  Reports tenderness to right forearm, + abrasions and scratches noted.  Pt reports dogs shots UTD. Pt states approx 5 minutes after incident pt felt "lightheaded and faint", states had + LOC, reports family member "caught" her and eased to floor.      38-year-old female patient comes in with main complaint of dog bite from her sister's dog.  Patient has a bite to the face and on the arm.  Otherwise patient states the dog is up-to-date on shots patient needs a tetanus shot and has several scratches on the skin.  Patient has no other complaints including no fever chills and night sweats on no palpation was going on her way to urgent care she did pass out but she states during that time she was very excited here patient's vitals remained stable and blood pressure 111/72 pulse of 65 temperature 97.2 respirations 18 oxygen 100%          Review of patient's allergies indicates:   Allergen Reactions    Asacol [mesalamine]     Ciprofloxacin     Morphine      Past Medical History:   Diagnosis Date    Crohn disease      Past Surgical History:   Procedure Laterality Date    SUBTOTAL COLECTOMY      with ileostomy reversal     Family History   Problem Relation Age of Onset    Hypertension Mother     Hypertension Father     Cancer Maternal Grandmother     Cancer Paternal Grandfather     Diabetes Cousin     Heart disease Neg Hx     Allergic rhinitis Neg Hx     Allergies Neg Hx     Angioedema Neg Hx     Asthma Neg Hx     Atopy Neg Hx     Eczema Neg Hx     Immunodeficiency Neg Hx     Rhinitis Neg Hx     Urticaria Neg Hx      Social History   Substance Use Topics    Smoking status: Never Smoker    Smokeless tobacco: Not on file    Alcohol use Yes      " Comment: social     Review of Systems   Constitutional: Negative for fever.   HENT: Negative for sore throat.    Respiratory: Negative for shortness of breath.    Cardiovascular: Negative for chest pain.   Gastrointestinal: Negative for nausea.   Genitourinary: Negative for dysuria.   Musculoskeletal: Negative for back pain.   Skin: Positive for wound. Negative for rash.   Neurological: Negative for weakness.   Hematological: Does not bruise/bleed easily.   All other systems reviewed and are negative.      Physical Exam     Initial Vitals [07/15/18 1350]   BP Pulse Resp Temp SpO2   111/72 65 18 97.2 °F (36.2 °C) 100 %      MAP       --         Physical Exam    Nursing note and vitals reviewed.  Constitutional: She appears well-developed.   HENT:   Head: Normocephalic and atraumatic.   Right Ear: External ear normal.   Left Ear: External ear normal.   Nose: Nose normal.   Mouth/Throat: Oropharynx is clear and moist.   Eyes: Conjunctivae and EOM are normal. Pupils are equal, round, and reactive to light. Right eye exhibits no discharge. Left eye exhibits no discharge.   Neck: Normal range of motion. Neck supple. No tracheal deviation present.   Cardiovascular: Normal rate, regular rhythm and normal heart sounds.   No murmur heard.  Pulmonary/Chest: Breath sounds normal. No respiratory distress. She has no wheezes.   Abdominal: Soft. Bowel sounds are normal.   Neurological: She is alert and oriented to person, place, and time.   Skin: Skin is warm and dry.   Dog bite right side of the face and cheek right arm anterior aspect of the right mid forearm under the mid chin         ED Course   Procedures  Labs Reviewed - No data to display       Imaging Results    None          Medical Decision Making:   Initial Assessment:   Patient in moderate distress and no other complains    Differential Diagnosis:   Laceration  Cellulitis  Abscess  infection                        Clinical Impression:   The encounter diagnosis was Dog  bite, initial encounter.                             Marco A Valero MD  07/15/18 1149

## 2018-07-15 NOTE — ED NOTES
[place] police department called in reference to patients dog bite, officer states that the patient needs to file a report in [facility] since it happen there.  informed that the patient do not want to file a report and she stated that's fine, the patient do not have to.

## 2018-10-16 ENCOUNTER — PATIENT MESSAGE (OUTPATIENT)
Dept: ADMINISTRATIVE | Facility: HOSPITAL | Age: 38
End: 2018-10-16

## 2019-03-21 ENCOUNTER — TELEPHONE (OUTPATIENT)
Dept: INTERNAL MEDICINE | Facility: CLINIC | Age: 39
End: 2019-03-21

## 2020-10-05 ENCOUNTER — PATIENT MESSAGE (OUTPATIENT)
Dept: ADMINISTRATIVE | Facility: HOSPITAL | Age: 40
End: 2020-10-05

## 2021-01-04 ENCOUNTER — PATIENT MESSAGE (OUTPATIENT)
Dept: ADMINISTRATIVE | Facility: HOSPITAL | Age: 41
End: 2021-01-04

## 2021-01-08 ENCOUNTER — CLINICAL SUPPORT (OUTPATIENT)
Dept: URGENT CARE | Facility: CLINIC | Age: 41
End: 2021-01-08
Payer: COMMERCIAL

## 2021-01-08 DIAGNOSIS — U07.1 COVID-19 VIRUS DETECTED: ICD-10-CM

## 2021-01-08 DIAGNOSIS — Z11.9 SCREENING EXAMINATION FOR UNSPECIFIED INFECTIOUS DISEASE: Primary | ICD-10-CM

## 2021-01-08 LAB
CTP QC/QA: YES
SARS-COV-2 RDRP RESP QL NAA+PROBE: POSITIVE

## 2021-01-08 PROCEDURE — 99211 PR OFFICE/OUTPT VISIT, EST, LEVL I: ICD-10-PCS | Mod: S$GLB,,, | Performed by: FAMILY MEDICINE

## 2021-01-08 PROCEDURE — U0002 COVID-19 LAB TEST NON-CDC: HCPCS | Mod: QW,S$GLB,, | Performed by: FAMILY MEDICINE

## 2021-01-08 PROCEDURE — U0002: ICD-10-PCS | Mod: QW,S$GLB,, | Performed by: FAMILY MEDICINE

## 2021-01-08 PROCEDURE — 99211 OFF/OP EST MAY X REQ PHY/QHP: CPT | Mod: S$GLB,,, | Performed by: FAMILY MEDICINE

## 2021-04-05 ENCOUNTER — PATIENT MESSAGE (OUTPATIENT)
Dept: ADMINISTRATIVE | Facility: HOSPITAL | Age: 41
End: 2021-04-05

## 2021-05-04 ENCOUNTER — PATIENT MESSAGE (OUTPATIENT)
Dept: RESEARCH | Facility: HOSPITAL | Age: 41
End: 2021-05-04

## 2021-05-10 ENCOUNTER — PATIENT MESSAGE (OUTPATIENT)
Dept: RESEARCH | Facility: HOSPITAL | Age: 41
End: 2021-05-10

## 2021-10-30 ENCOUNTER — OFFICE VISIT (OUTPATIENT)
Dept: URGENT CARE | Facility: CLINIC | Age: 41
End: 2021-10-30
Payer: COMMERCIAL

## 2021-10-30 VITALS
WEIGHT: 150 LBS | BODY MASS INDEX: 24.99 KG/M2 | OXYGEN SATURATION: 96 % | HEIGHT: 65 IN | TEMPERATURE: 98 F | HEART RATE: 69 BPM | SYSTOLIC BLOOD PRESSURE: 122 MMHG | DIASTOLIC BLOOD PRESSURE: 76 MMHG

## 2021-10-30 DIAGNOSIS — S05.01XA ABRASION OF RIGHT CORNEA, INITIAL ENCOUNTER: Primary | ICD-10-CM

## 2021-10-30 PROCEDURE — 99213 OFFICE O/P EST LOW 20 MIN: CPT | Mod: S$GLB,,, | Performed by: NURSE PRACTITIONER

## 2021-10-30 PROCEDURE — 99213 PR OFFICE/OUTPT VISIT, EST, LEVL III, 20-29 MIN: ICD-10-PCS | Mod: S$GLB,,, | Performed by: NURSE PRACTITIONER

## 2021-10-30 RX ORDER — TOBRAMYCIN 3 MG/ML
1 SOLUTION/ DROPS OPHTHALMIC EVERY 4 HOURS
Qty: 5 ML | Refills: 0 | Status: SHIPPED | OUTPATIENT
Start: 2021-10-30 | End: 2021-11-09

## 2021-12-29 ENCOUNTER — OFFICE VISIT (OUTPATIENT)
Dept: URGENT CARE | Facility: CLINIC | Age: 41
End: 2021-12-29
Payer: COMMERCIAL

## 2021-12-29 VITALS
RESPIRATION RATE: 17 BRPM | HEIGHT: 65 IN | BODY MASS INDEX: 24.99 KG/M2 | SYSTOLIC BLOOD PRESSURE: 130 MMHG | TEMPERATURE: 98 F | WEIGHT: 150 LBS | HEART RATE: 89 BPM | DIASTOLIC BLOOD PRESSURE: 74 MMHG | OXYGEN SATURATION: 98 %

## 2021-12-29 DIAGNOSIS — R05.9 COUGH: ICD-10-CM

## 2021-12-29 DIAGNOSIS — Z11.59 SCREENING FOR VIRAL DISEASE: Primary | ICD-10-CM

## 2021-12-29 DIAGNOSIS — B96.89 ACUTE BACTERIAL SINUSITIS: ICD-10-CM

## 2021-12-29 DIAGNOSIS — J01.90 ACUTE BACTERIAL SINUSITIS: ICD-10-CM

## 2021-12-29 LAB
CTP QC/QA: YES
SARS-COV-2 RDRP RESP QL NAA+PROBE: NEGATIVE

## 2021-12-29 PROCEDURE — 99213 PR OFFICE/OUTPT VISIT, EST, LEVL III, 20-29 MIN: ICD-10-PCS | Mod: S$GLB,,,

## 2021-12-29 PROCEDURE — 3008F PR BODY MASS INDEX (BMI) DOCUMENTED: ICD-10-PCS | Mod: CPTII,S$GLB,,

## 2021-12-29 PROCEDURE — U0002: ICD-10-PCS | Mod: QW,S$GLB,,

## 2021-12-29 PROCEDURE — 1159F PR MEDICATION LIST DOCUMENTED IN MEDICAL RECORD: ICD-10-PCS | Mod: CPTII,S$GLB,,

## 2021-12-29 PROCEDURE — U0002 COVID-19 LAB TEST NON-CDC: HCPCS | Mod: QW,S$GLB,,

## 2021-12-29 PROCEDURE — 1160F PR REVIEW ALL MEDS BY PRESCRIBER/CLIN PHARMACIST DOCUMENTED: ICD-10-PCS | Mod: CPTII,S$GLB,,

## 2021-12-29 PROCEDURE — 3075F PR MOST RECENT SYSTOLIC BLOOD PRESS GE 130-139MM HG: ICD-10-PCS | Mod: CPTII,S$GLB,,

## 2021-12-29 PROCEDURE — 3075F SYST BP GE 130 - 139MM HG: CPT | Mod: CPTII,S$GLB,,

## 2021-12-29 PROCEDURE — 1160F RVW MEDS BY RX/DR IN RCRD: CPT | Mod: CPTII,S$GLB,,

## 2021-12-29 PROCEDURE — 3078F PR MOST RECENT DIASTOLIC BLOOD PRESSURE < 80 MM HG: ICD-10-PCS | Mod: CPTII,S$GLB,,

## 2021-12-29 PROCEDURE — 3078F DIAST BP <80 MM HG: CPT | Mod: CPTII,S$GLB,,

## 2021-12-29 PROCEDURE — 3008F BODY MASS INDEX DOCD: CPT | Mod: CPTII,S$GLB,,

## 2021-12-29 PROCEDURE — 99213 OFFICE O/P EST LOW 20 MIN: CPT | Mod: S$GLB,,,

## 2021-12-29 PROCEDURE — 1159F MED LIST DOCD IN RCRD: CPT | Mod: CPTII,S$GLB,,

## 2021-12-29 RX ORDER — FLUTICASONE PROPIONATE 50 MCG
1 SPRAY, SUSPENSION (ML) NASAL DAILY
Qty: 9.9 ML | Refills: 0 | Status: SHIPPED | OUTPATIENT
Start: 2021-12-29

## 2021-12-29 RX ORDER — AMOXICILLIN AND CLAVULANATE POTASSIUM 875; 125 MG/1; MG/1
1 TABLET, FILM COATED ORAL EVERY 12 HOURS
Qty: 20 TABLET | Refills: 0 | Status: SHIPPED | OUTPATIENT
Start: 2021-12-29 | End: 2022-01-08

## 2021-12-29 RX ORDER — BENZONATATE 100 MG/1
100 CAPSULE ORAL 3 TIMES DAILY PRN
Qty: 30 CAPSULE | Refills: 0 | Status: SHIPPED | OUTPATIENT
Start: 2021-12-29 | End: 2022-01-08

## 2022-05-03 ENCOUNTER — PATIENT MESSAGE (OUTPATIENT)
Dept: RESEARCH | Facility: HOSPITAL | Age: 42
End: 2022-05-03
Payer: COMMERCIAL

## 2023-08-11 ENCOUNTER — PATIENT MESSAGE (OUTPATIENT)
Dept: RESEARCH | Facility: HOSPITAL | Age: 43
End: 2023-08-11
Payer: COMMERCIAL

## 2024-06-17 ENCOUNTER — OFFICE VISIT (OUTPATIENT)
Dept: PRIMARY CARE CLINIC | Facility: CLINIC | Age: 44
End: 2024-06-17
Payer: COMMERCIAL

## 2024-06-17 VITALS
SYSTOLIC BLOOD PRESSURE: 102 MMHG | OXYGEN SATURATION: 96 % | BODY MASS INDEX: 30.3 KG/M2 | HEIGHT: 65 IN | WEIGHT: 181.88 LBS | DIASTOLIC BLOOD PRESSURE: 70 MMHG | HEART RATE: 100 BPM

## 2024-06-17 DIAGNOSIS — Z00.00 ANNUAL PHYSICAL EXAM: Primary | ICD-10-CM

## 2024-06-17 DIAGNOSIS — Z13.1 SCREENING FOR DIABETES MELLITUS: ICD-10-CM

## 2024-06-17 DIAGNOSIS — K50.00 CROHN'S DISEASE OF SMALL INTESTINE WITHOUT COMPLICATION: ICD-10-CM

## 2024-06-17 DIAGNOSIS — E66.9 CLASS 1 OBESITY WITH BODY MASS INDEX (BMI) OF 30.0 TO 30.9 IN ADULT, UNSPECIFIED OBESITY TYPE, UNSPECIFIED WHETHER SERIOUS COMORBIDITY PRESENT: ICD-10-CM

## 2024-06-17 PROBLEM — K50.90 CROHN'S DISEASE: Status: ACTIVE | Noted: 2024-06-07

## 2024-06-17 PROCEDURE — 3074F SYST BP LT 130 MM HG: CPT | Mod: CPTII,S$GLB,, | Performed by: STUDENT IN AN ORGANIZED HEALTH CARE EDUCATION/TRAINING PROGRAM

## 2024-06-17 PROCEDURE — 3008F BODY MASS INDEX DOCD: CPT | Mod: CPTII,S$GLB,, | Performed by: STUDENT IN AN ORGANIZED HEALTH CARE EDUCATION/TRAINING PROGRAM

## 2024-06-17 PROCEDURE — 1160F RVW MEDS BY RX/DR IN RCRD: CPT | Mod: CPTII,S$GLB,, | Performed by: STUDENT IN AN ORGANIZED HEALTH CARE EDUCATION/TRAINING PROGRAM

## 2024-06-17 PROCEDURE — 3078F DIAST BP <80 MM HG: CPT | Mod: CPTII,S$GLB,, | Performed by: STUDENT IN AN ORGANIZED HEALTH CARE EDUCATION/TRAINING PROGRAM

## 2024-06-17 PROCEDURE — 99386 PREV VISIT NEW AGE 40-64: CPT | Mod: S$GLB,,, | Performed by: STUDENT IN AN ORGANIZED HEALTH CARE EDUCATION/TRAINING PROGRAM

## 2024-06-17 PROCEDURE — 1159F MED LIST DOCD IN RCRD: CPT | Mod: CPTII,S$GLB,, | Performed by: STUDENT IN AN ORGANIZED HEALTH CARE EDUCATION/TRAINING PROGRAM

## 2024-06-17 PROCEDURE — 99999 PR PBB SHADOW E&M-EST. PATIENT-LVL III: CPT | Mod: PBBFAC,,, | Performed by: STUDENT IN AN ORGANIZED HEALTH CARE EDUCATION/TRAINING PROGRAM

## 2024-06-17 NOTE — PROGRESS NOTES
SUBJECTIVE     Chief Complaint   Patient presents with    Establish Care       HPI  Lynda Reveles is a 43 y.o. female with medical diagnoses as listed in the medical history and problem list that presents for annual exam. Pt is establishing care with me today.    Pt is UTD on age appropriate CA screening.    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      Health Maintenance         Date Due Completion Date    Hepatitis C Screening Never done ---    Cervical Cancer Screening Never done ---    HIV Screening Never done ---    Pneumococcal Vaccines (Age 0-64) (2 of 2 - PPSV23 or PCV20) 07/02/2018 5/7/2018    Hemoglobin A1c (Diabetic Prevention Screening) 05/02/2021 5/2/2018    COVID-19 Vaccine (4 - 2023-24 season) 09/01/2023 11/18/2022    Influenza Vaccine (Season Ended) 09/01/2024 9/23/2022    Mammogram 06/05/2025 6/5/2024    TETANUS VACCINE 07/15/2028 7/15/2018              PAST MEDICAL HISTORY:  Past Medical History:   Diagnosis Date    Crohn disease        PAST SURGICAL HISTORY:  Past Surgical History:   Procedure Laterality Date    APPENDECTOMY  1/2000    this was just part of my subtotal colectomy, not a separate procedure    COLON SURGERY  1/10/2000    subtotal colectomy    SUBTOTAL COLECTOMY      with ileostomy reversal       SOCIAL HISTORY:  Social History     Socioeconomic History    Marital status: Single   Tobacco Use    Smoking status: Never    Smokeless tobacco: Never   Substance and Sexual Activity    Alcohol use: Yes     Alcohol/week: 5.0 standard  drinks of alcohol     Types: 5 Drinks containing 0.5 oz of alcohol per week     Comment: social    Drug use: No    Sexual activity: Yes     Partners: Male     Birth control/protection: Other-see comments     Comment: birth control pill       FAMILY HISTORY:  Family History   Problem Relation Name Age of Onset    Hypertension Mother Valencia Reveles     Hypertension Father Jose Juan Reveles     Lung cancer Maternal Grandmother Katelyn Desouza         smoker    Lung cancer Paternal Grandfather Mando Reveles         smoker    Diabetes Cousin      Diabetes Paternal Cousin Nidia Reveles     Heart disease Neg Hx      Allergic rhinitis Neg Hx      Allergies Neg Hx      Angioedema Neg Hx      Asthma Neg Hx      Atopy Neg Hx      Eczema Neg Hx      Immunodeficiency Neg Hx      Rhinitis Neg Hx      Urticaria Neg Hx         ALLERGIES AND MEDICATIONS: updated and reviewed.  Review of patient's allergies indicates:   Allergen Reactions    Asacol [mesalamine]     Ciprofloxacin     Meperidine     Morphine      Current Outpatient Medications   Medication Sig Dispense Refill    escitalopram oxalate (LEXAPRO) 10 MG tablet Take 10 mg by mouth once daily.  12    mercaptopurine (PURINETHOL) 50 mg tablet Take 25 mg by mouth every other day.  2    norgestimate-ethinyl estradiol (ORTHO TRI-CYCLEN,TRI-SPRINTEC) 0.18/0.215/0.25 mg-35 mcg (28) tablet TAKE 1 TABLET BY MOUTH EVERY DAY MUST MAKE DR APPOINTMENT  2     No current facility-administered medications for this visit.       ROS  Review of Systems   Constitutional:  Negative for fever and weight loss.   Respiratory:  Negative for cough and shortness of breath.    Cardiovascular:  Negative for chest pain and palpitations.   Gastrointestinal:  Negative for abdominal pain, constipation, diarrhea, nausea and vomiting.   Genitourinary:  Negative for dysuria.   Musculoskeletal:  Negative for back pain and joint pain.   Skin:  Negative for rash.   Neurological:  Negative for dizziness, weakness and  "headaches.   Psychiatric/Behavioral:  Negative for depression. The patient is not nervous/anxious.            OBJECTIVE     Physical Exam  Vitals:    06/17/24 1308   BP: 102/70   Pulse: 100    Body mass index is 30.27 kg/m².  Weight: 82.5 kg (181 lb 14.1 oz)   Height: 5' 5" (165.1 cm)     Physical Exam  HENT:      Head: Normocephalic and atraumatic.      Nose: Nose normal.      Mouth/Throat:      Mouth: Mucous membranes are moist.      Pharynx: Oropharynx is clear.   Eyes:      Extraocular Movements: Extraocular movements intact.      Conjunctiva/sclera: Conjunctivae normal.      Pupils: Pupils are equal, round, and reactive to light.   Cardiovascular:      Rate and Rhythm: Normal rate and regular rhythm.   Pulmonary:      Effort: Pulmonary effort is normal.      Breath sounds: Normal breath sounds.   Musculoskeletal:         General: No swelling. Normal range of motion.      Cervical back: Normal range of motion.      Right lower leg: No edema.      Left lower leg: No edema.   Skin:     General: Skin is warm.      Findings: No lesion or rash.   Neurological:      General: No focal deficit present.      Mental Status: She is alert and oriented to person, place, and time.      Motor: No weakness.   Psychiatric:         Mood and Affect: Mood normal.         Thought Content: Thought content normal.               ASSESSMENT     43 y.o. female with     1. Annual physical exam    2. Class 1 obesity with body mass index (BMI) of 30.0 to 30.9 in adult, unspecified obesity type, unspecified whether serious comorbidity present    3. Screening for diabetes mellitus    4. Crohn's disease of small intestine without complication        PLAN:     1. Annual physical exam    2. Class 1 obesity with body mass index (BMI) of 30.0 to 30.9 in adult, unspecified obesity type, unspecified whether serious comorbidity present  -     Hemoglobin A1C; Future; Expected date: 06/17/2024    3. Screening for diabetes mellitus  -     TSH; Future; " Expected date: 06/17/2024  -     Lipid Panel; Future; Expected date: 06/17/2024  -     Comprehensive Metabolic Panel; Future; Expected date: 06/17/2024  -     CBC Auto Differential; Future; Expected date: 06/17/2024    4. Crohn's disease of small intestine without complication  Overview:  Dx at 19     Orders:  -     Ambulatory referral/consult to Gastroenterology; Future; Expected date: 06/24/2024        Discussed age and gender appropriate screenings at this visit and encouraged a healthy diet low in simple carbohydrates, and increased physical activity.  Counseled on medically appropriate vaccines based on age and current health condition.  Screening test reviewed and discussed with patient.      RTC in 1 year     Fransisca Granados MD  06/17/2024 12:24 PM

## 2024-06-26 ENCOUNTER — LAB VISIT (OUTPATIENT)
Dept: LAB | Facility: HOSPITAL | Age: 44
End: 2024-06-26
Attending: STUDENT IN AN ORGANIZED HEALTH CARE EDUCATION/TRAINING PROGRAM
Payer: COMMERCIAL

## 2024-06-26 DIAGNOSIS — Z13.1 SCREENING FOR DIABETES MELLITUS: ICD-10-CM

## 2024-06-26 DIAGNOSIS — E66.9 CLASS 1 OBESITY WITH BODY MASS INDEX (BMI) OF 30.0 TO 30.9 IN ADULT, UNSPECIFIED OBESITY TYPE, UNSPECIFIED WHETHER SERIOUS COMORBIDITY PRESENT: ICD-10-CM

## 2024-06-26 LAB
ALBUMIN SERPL BCP-MCNC: 3.8 G/DL (ref 3.5–5.2)
ALP SERPL-CCNC: 60 U/L (ref 55–135)
ALT SERPL W/O P-5'-P-CCNC: 12 U/L (ref 10–44)
ANION GAP SERPL CALC-SCNC: 6 MMOL/L (ref 8–16)
AST SERPL-CCNC: 15 U/L (ref 10–40)
BASOPHILS # BLD AUTO: 0.04 K/UL (ref 0–0.2)
BASOPHILS NFR BLD: 0.8 % (ref 0–1.9)
BILIRUB SERPL-MCNC: 0.5 MG/DL (ref 0.1–1)
BUN SERPL-MCNC: 13 MG/DL (ref 6–20)
CALCIUM SERPL-MCNC: 9.5 MG/DL (ref 8.7–10.5)
CHLORIDE SERPL-SCNC: 107 MMOL/L (ref 95–110)
CHOLEST SERPL-MCNC: 175 MG/DL (ref 120–199)
CHOLEST/HDLC SERPL: 3.4 {RATIO} (ref 2–5)
CO2 SERPL-SCNC: 25 MMOL/L (ref 23–29)
CREAT SERPL-MCNC: 1 MG/DL (ref 0.5–1.4)
DIFFERENTIAL METHOD BLD: NORMAL
EOSINOPHIL # BLD AUTO: 0.1 K/UL (ref 0–0.5)
EOSINOPHIL NFR BLD: 1.4 % (ref 0–8)
ERYTHROCYTE [DISTWIDTH] IN BLOOD BY AUTOMATED COUNT: 12.4 % (ref 11.5–14.5)
EST. GFR  (NO RACE VARIABLE): >60 ML/MIN/1.73 M^2
ESTIMATED AVG GLUCOSE: 97 MG/DL (ref 68–131)
GLUCOSE SERPL-MCNC: 93 MG/DL (ref 70–110)
HBA1C MFR BLD: 5 % (ref 4–5.6)
HCT VFR BLD AUTO: 43.5 % (ref 37–48.5)
HDLC SERPL-MCNC: 52 MG/DL (ref 40–75)
HDLC SERPL: 29.7 % (ref 20–50)
HGB BLD-MCNC: 14.1 G/DL (ref 12–16)
IMM GRANULOCYTES # BLD AUTO: 0.01 K/UL (ref 0–0.04)
IMM GRANULOCYTES NFR BLD AUTO: 0.2 % (ref 0–0.5)
LDLC SERPL CALC-MCNC: 97.6 MG/DL (ref 63–159)
LYMPHOCYTES # BLD AUTO: 1.5 K/UL (ref 1–4.8)
LYMPHOCYTES NFR BLD: 28.3 % (ref 18–48)
MCH RBC QN AUTO: 29 PG (ref 27–31)
MCHC RBC AUTO-ENTMCNC: 32.4 G/DL (ref 32–36)
MCV RBC AUTO: 90 FL (ref 82–98)
MONOCYTES # BLD AUTO: 0.4 K/UL (ref 0.3–1)
MONOCYTES NFR BLD: 8 % (ref 4–15)
NEUTROPHILS # BLD AUTO: 3.2 K/UL (ref 1.8–7.7)
NEUTROPHILS NFR BLD: 61.3 % (ref 38–73)
NONHDLC SERPL-MCNC: 123 MG/DL
NRBC BLD-RTO: 0 /100 WBC
PLATELET # BLD AUTO: 311 K/UL (ref 150–450)
PMV BLD AUTO: 10.8 FL (ref 9.2–12.9)
POTASSIUM SERPL-SCNC: 4.3 MMOL/L (ref 3.5–5.1)
PROT SERPL-MCNC: 6.9 G/DL (ref 6–8.4)
RBC # BLD AUTO: 4.86 M/UL (ref 4–5.4)
SODIUM SERPL-SCNC: 138 MMOL/L (ref 136–145)
TRIGL SERPL-MCNC: 127 MG/DL (ref 30–150)
TSH SERPL DL<=0.005 MIU/L-ACNC: 2.58 UIU/ML (ref 0.4–4)
WBC # BLD AUTO: 5.15 K/UL (ref 3.9–12.7)

## 2024-06-26 PROCEDURE — 36415 COLL VENOUS BLD VENIPUNCTURE: CPT | Performed by: STUDENT IN AN ORGANIZED HEALTH CARE EDUCATION/TRAINING PROGRAM

## 2024-06-26 PROCEDURE — 85025 COMPLETE CBC W/AUTO DIFF WBC: CPT | Performed by: STUDENT IN AN ORGANIZED HEALTH CARE EDUCATION/TRAINING PROGRAM

## 2024-06-26 PROCEDURE — 84443 ASSAY THYROID STIM HORMONE: CPT | Performed by: STUDENT IN AN ORGANIZED HEALTH CARE EDUCATION/TRAINING PROGRAM

## 2024-06-26 PROCEDURE — 83036 HEMOGLOBIN GLYCOSYLATED A1C: CPT | Performed by: STUDENT IN AN ORGANIZED HEALTH CARE EDUCATION/TRAINING PROGRAM

## 2024-06-26 PROCEDURE — 80053 COMPREHEN METABOLIC PANEL: CPT | Performed by: STUDENT IN AN ORGANIZED HEALTH CARE EDUCATION/TRAINING PROGRAM

## 2024-06-26 PROCEDURE — 80061 LIPID PANEL: CPT | Performed by: STUDENT IN AN ORGANIZED HEALTH CARE EDUCATION/TRAINING PROGRAM

## 2024-07-26 ENCOUNTER — TELEPHONE (OUTPATIENT)
Dept: GASTROENTEROLOGY | Facility: CLINIC | Age: 44
End: 2024-07-26
Payer: COMMERCIAL

## 2024-09-11 NOTE — PROGRESS NOTES
[facility] Telemedicine Consult Note    The patient location is: home   The chief complaint leading to consultation is: Crohn's disease     Visit type: audiovisual    Face to Face time with patient: 15  30 minutes of total time spent on the encounter, which includes face to face time and non-face to face time preparing to see the patient (eg, review of tests), Obtaining and/or reviewing separately obtained history, Documenting clinical information in the electronic or other health record, Independently interpreting results (not separately reported) and communicating results to the patient/family/caregiver, or Care coordination (not separately reported).       Each patient to whom he or she provides medical services by telemedicine is:  (1) informed of the relationship between the physician and patient and the respective role of any other health care provider with respect to management of the patient; and (2) notified that he or she may decline to receive medical services by telemedicine and may withdraw from such care at any time.       PCP:   Fransisca Granados   [address] / [place] [address]    Referring MD:  Fransisca Granados Md  [address] [place] [address]    HPI:  This is a 44 y.o. female here for evaluation of Crohn's disease. Previously seen by GI doctor at . Last seen around 2 years ago. Dx at age 19 with symptoms of abdominal, diarrhea and blood in stool. Colonoscopy showed colonic inflammation. She had a history of subtotal colectomy and ileostomy bag w/ reversal after dx in 2000. Has sigmoid and rectum left. Also history of appendectomy. No other abdominal surgeries. In the past was managed with steroids and asacol (stopped as she developed pancreatitis). Started she was given two doses of remicade but stopped after an infusion reaction. Has been managed since early 2000 with 6-MP. Last colonoscopy was around 5 years ago which looked good. She was  due last year for one. No recent hospitalizations. She is feeling well. Recent CBC and CMP wnl. No abdominal imaging on file. No endoscopy on file. UTD on vaccines.     Medical History:  has a past medical history of Crohn disease.    Surgical History:  has a past surgical history that includes Subtotal colectomy; Appendectomy (1/2000); and Colon surgery (1/10/2000).    Family History: family history includes Diabetes in her cousin and paternal cousin; Hypertension in her father and mother; Lung cancer in her maternal grandmother and paternal grandfather..     Social History:  reports that she has never smoked. She has never used smokeless tobacco. She reports current alcohol use of about 5.0 standard drinks of alcohol per week. She reports that she does not use drugs.        Imaging:  Reviewed     Endoscopy:    None     Assessment:    Crohn's disease   - Extent: colon   - Surgeries: subtotal colectomy   - Prior meds: steroids, asacol (got pancreatitis), remicade (2001-2) but only for two doses due to infusion reaction  - Current meds: mercaptopurine EOD   - Complications: none (no fistula or strictures)   - Extraintestinal manifestations: no joint, skin or eye issues      Recommendations:    - CRP, Calpro   - CBC and thio purine metabolites as she is on 6MP   - Colonoscopy with biopsies (seems only sigmoid and rectum remain)   - Likely plan for IBD clinic follow up after if needed   - Discussed importance of yearly derm exams, vaccinations are UTD     RTC after endoscopy       Order summary:  Orders Placed This Encounter    C-reactive protein    Calprotectin, Stool    Thiopurine Metabolites    CBC Auto Differential         Thank you so much for allowing me to participate in the care of Lynda Noe MD

## 2024-09-12 ENCOUNTER — OFFICE VISIT (OUTPATIENT)
Dept: GASTROENTEROLOGY | Facility: CLINIC | Age: 44
End: 2024-09-12
Payer: COMMERCIAL

## 2024-09-12 DIAGNOSIS — K50.00 CROHN'S DISEASE OF SMALL INTESTINE WITHOUT COMPLICATION: ICD-10-CM

## 2024-09-12 PROCEDURE — 3044F HG A1C LEVEL LT 7.0%: CPT | Mod: CPTII,95,, | Performed by: STUDENT IN AN ORGANIZED HEALTH CARE EDUCATION/TRAINING PROGRAM

## 2024-09-12 PROCEDURE — 99204 OFFICE O/P NEW MOD 45 MIN: CPT | Mod: 95,,, | Performed by: STUDENT IN AN ORGANIZED HEALTH CARE EDUCATION/TRAINING PROGRAM

## 2024-09-13 ENCOUNTER — TELEPHONE (OUTPATIENT)
Dept: ENDOSCOPY | Facility: HOSPITAL | Age: 44
End: 2024-09-13
Payer: COMMERCIAL

## 2024-09-13 ENCOUNTER — LAB VISIT (OUTPATIENT)
Dept: LAB | Facility: HOSPITAL | Age: 44
End: 2024-09-13
Attending: STUDENT IN AN ORGANIZED HEALTH CARE EDUCATION/TRAINING PROGRAM
Payer: COMMERCIAL

## 2024-09-13 ENCOUNTER — PATIENT MESSAGE (OUTPATIENT)
Dept: ENDOSCOPY | Facility: HOSPITAL | Age: 44
End: 2024-09-13
Payer: COMMERCIAL

## 2024-09-13 DIAGNOSIS — K50.00 CROHN'S DISEASE OF SMALL INTESTINE WITHOUT COMPLICATION: ICD-10-CM

## 2024-09-13 LAB
BASOPHILS # BLD AUTO: 0.07 K/UL (ref 0–0.2)
BASOPHILS NFR BLD: 1.1 % (ref 0–1.9)
CRP SERPL-MCNC: 5.3 MG/L (ref 0–8.2)
DIFFERENTIAL METHOD BLD: NORMAL
EOSINOPHIL # BLD AUTO: 0.1 K/UL (ref 0–0.5)
EOSINOPHIL NFR BLD: 1.7 % (ref 0–8)
ERYTHROCYTE [DISTWIDTH] IN BLOOD BY AUTOMATED COUNT: 12.7 % (ref 11.5–14.5)
HCT VFR BLD AUTO: 41.7 % (ref 37–48.5)
HGB BLD-MCNC: 13.8 G/DL (ref 12–16)
IMM GRANULOCYTES # BLD AUTO: 0.02 K/UL (ref 0–0.04)
IMM GRANULOCYTES NFR BLD AUTO: 0.3 % (ref 0–0.5)
LYMPHOCYTES # BLD AUTO: 1.7 K/UL (ref 1–4.8)
LYMPHOCYTES NFR BLD: 25.9 % (ref 18–48)
MCH RBC QN AUTO: 29.7 PG (ref 27–31)
MCHC RBC AUTO-ENTMCNC: 33.1 G/DL (ref 32–36)
MCV RBC AUTO: 90 FL (ref 82–98)
MONOCYTES # BLD AUTO: 0.6 K/UL (ref 0.3–1)
MONOCYTES NFR BLD: 9.5 % (ref 4–15)
NEUTROPHILS # BLD AUTO: 4.1 K/UL (ref 1.8–7.7)
NEUTROPHILS NFR BLD: 61.5 % (ref 38–73)
NRBC BLD-RTO: 0 /100 WBC
PLATELET # BLD AUTO: 337 K/UL (ref 150–450)
PMV BLD AUTO: 10.6 FL (ref 9.2–12.9)
RBC # BLD AUTO: 4.64 M/UL (ref 4–5.4)
WBC # BLD AUTO: 6.61 K/UL (ref 3.9–12.7)

## 2024-09-13 PROCEDURE — 86140 C-REACTIVE PROTEIN: CPT | Performed by: STUDENT IN AN ORGANIZED HEALTH CARE EDUCATION/TRAINING PROGRAM

## 2024-09-13 PROCEDURE — 85025 COMPLETE CBC W/AUTO DIFF WBC: CPT | Performed by: STUDENT IN AN ORGANIZED HEALTH CARE EDUCATION/TRAINING PROGRAM

## 2024-09-13 PROCEDURE — 36415 COLL VENOUS BLD VENIPUNCTURE: CPT | Performed by: STUDENT IN AN ORGANIZED HEALTH CARE EDUCATION/TRAINING PROGRAM

## 2024-09-13 NOTE — TELEPHONE ENCOUNTER
Contacted the patient to schedule an endoscopy procedure(s) colonoscopy. The patient did not answer the call and left a voice message requesting a call back.

## 2024-09-13 NOTE — TELEPHONE ENCOUNTER
"----- Message from Sowmya Chacon sent at 2024  9:54 AM CDT -----    ----- Message -----  From: Maya Noe MD  Sent: 2024  12:39 PM CDT  To: [facility] Endoscop[facility] Patients    Procedure: Colonoscopy    Diagnosis: Crohn's disease     Procedure Timin-12 weeks    #If within 4 weeks selected, please marek as high priority#    #If greater than 12 weeks, please select "5-12 weeks" and delay sending until 3 months prior to requested date#     Location: Any Site    Additional Scheduling Information: No scheduling concerns    Prep Specifications:Standard prep    Is the patient taking a GLP-1 Agonist:no    Have you attached a patient to this message: yes  "

## 2024-09-30 ENCOUNTER — PATIENT MESSAGE (OUTPATIENT)
Dept: ENDOSCOPY | Facility: HOSPITAL | Age: 44
End: 2024-09-30
Payer: COMMERCIAL

## 2024-09-30 ENCOUNTER — TELEPHONE (OUTPATIENT)
Dept: ENDOSCOPY | Facility: HOSPITAL | Age: 44
End: 2024-09-30
Payer: COMMERCIAL

## 2024-09-30 NOTE — TELEPHONE ENCOUNTER
"----- Message from Sowmya sent at 2024  9:54 AM CDT -----    ----- Message -----  From: Maya Noe MD  Sent: 2024  12:39 PM CDT  To: [facility] Endoscopist [facility] Patients    Procedure: Colonoscopy    Diagnosis: Crohn's disease     Procedure Timin-12 weeks    #If within 4 weeks selected, please marek as high priority#    #If greater than 12 weeks, please select "5-12 weeks" and delay sending until 3 months prior to requested date#     Location: Any Site    Additional Scheduling Information: No scheduling concerns    Prep Specifications:Standard prep    Is the patient taking a GLP-1 Agonist:no    Have you attached a patient to this message: yes  "

## 2024-09-30 NOTE — TELEPHONE ENCOUNTER
"----- Message from Sowmya sent at 2024  9:54 AM CDT -----    ----- Message -----  From: Maya Noe MD  Sent: 2024  12:39 PM CDT  To: [facility] Endoscopist Clinic Patients    Procedure: Colonoscopy    Diagnosis: Crohn's disease     Procedure Timin-12 weeks    #If within 4 weeks selected, please marek as high priority#    #If greater than 12 weeks, please select "5-12 weeks" and delay sending until 3 months prior to requested date#     Location: Any Site    Additional Scheduling Information: No scheduling concerns    Prep Specifications:Standard prep    Is the patient taking a GLP-1 Agonist:no    Have you attached a patient to this message: yes  "

## 2024-09-30 NOTE — TELEPHONE ENCOUNTER
Contacted the patient to schedule an endoscopy procedure(s) Colonoscopy. The patient did not answer the call and left a voice message requesting a call back.      Dear Referring Provider and Staff,    The Endoscopy Scheduling Department has made 2+ attempts to reach the patient for scheduling their colonoscopy. All final attempts have been made for this referral, if the referring provider would like the patient to receive endoscopic care, please confirm with the patient whether they would like to proceed. If so, then submit a new referral and inform the Pt that the Endoscopy Scheduling department will be contacting them to schedule their procedure.      Thank you,    Endoscopy Scheduling Department              Endoscopy Scheduling Department  [facility] [address] [place] [address]  Take the Elevators to 2nd Floor Endoscopy Procedural Area      Date: 9/30/2024      Medical Record #       Dear  Lynda Reveles    An order for the following procedure(s) Colonoscopy was placed for you by   Dr. Maya Noe.     Since multiple attempts have been made to get in touch with you, this is the last notification. Please call the scheduling nurse to schedule this procedure as soon as possible.    If you have already scheduled this appointment, please disregard this letter. If you would like to cancel this request, please call the number listed below.     Sincerely,        Endoscopy Scheduling Department [phone]        Comments: Office hours are Monday through Friday 8-430p.

## 2024-10-18 ENCOUNTER — PATIENT MESSAGE (OUTPATIENT)
Dept: PRIMARY CARE CLINIC | Facility: CLINIC | Age: 44
End: 2024-10-18
Payer: COMMERCIAL

## 2024-10-22 ENCOUNTER — OFFICE VISIT (OUTPATIENT)
Dept: PRIMARY CARE CLINIC | Facility: CLINIC | Age: 44
End: 2024-10-22
Payer: COMMERCIAL

## 2024-10-22 DIAGNOSIS — D84.821 DRUG-INDUCED IMMUNODEFICIENCY: ICD-10-CM

## 2024-10-22 DIAGNOSIS — G43.909 MIGRAINE WITHOUT STATUS MIGRAINOSUS, NOT INTRACTABLE, UNSPECIFIED MIGRAINE TYPE: Primary | ICD-10-CM

## 2024-10-22 DIAGNOSIS — Z79.899 DRUG-INDUCED IMMUNODEFICIENCY: ICD-10-CM

## 2024-10-22 DIAGNOSIS — K50.00 CROHN'S DISEASE OF SMALL INTESTINE WITHOUT COMPLICATION: ICD-10-CM

## 2024-10-22 PROCEDURE — 3044F HG A1C LEVEL LT 7.0%: CPT | Mod: CPTII,95,, | Performed by: STUDENT IN AN ORGANIZED HEALTH CARE EDUCATION/TRAINING PROGRAM

## 2024-10-22 PROCEDURE — 1159F MED LIST DOCD IN RCRD: CPT | Mod: CPTII,95,, | Performed by: STUDENT IN AN ORGANIZED HEALTH CARE EDUCATION/TRAINING PROGRAM

## 2024-10-22 PROCEDURE — 1160F RVW MEDS BY RX/DR IN RCRD: CPT | Mod: CPTII,95,, | Performed by: STUDENT IN AN ORGANIZED HEALTH CARE EDUCATION/TRAINING PROGRAM

## 2024-10-22 PROCEDURE — 99214 OFFICE O/P EST MOD 30 MIN: CPT | Mod: 95,,, | Performed by: STUDENT IN AN ORGANIZED HEALTH CARE EDUCATION/TRAINING PROGRAM

## 2024-10-22 RX ORDER — SUMATRIPTAN 50 MG/1
50 TABLET, FILM COATED ORAL
Qty: 9 TABLET | Refills: 0 | Status: SHIPPED | OUTPATIENT
Start: 2024-10-22 | End: 2024-11-21

## 2024-10-22 NOTE — PROGRESS NOTES
Subjective:       Patient ID: Lynda Reveles is a 44 y.o. female.    Chief Complaint: Migraines      The patient location is: [place], [place]  The chief complaint leading to consultation is: Migraines    Visit type: audiovisual    Face to Face time with patient: 10 minutes  15 minutes of total time spent on the encounter, which includes face to face time and non-face to face time preparing to see the patient (eg, review of tests), Obtaining and/or reviewing separately obtained history, Documenting clinical information in the electronic or other health record, Independently interpreting results (not separately reported) and communicating results to the patient/family/caregiver, or Care coordination (not separately reported).         Each patient to whom he or she provides medical services by telemedicine is:  (1) informed of the relationship between the physician and patient and the respective role of any other health care provider with respect to management of the patient; and (2) notified that he or she may decline to receive medical services by telemedicine and may withdraw from such care at any time.    Notes:     HPI    Migraine Headaches:   Previous history of migraine headaches in her 20s  Pain behind R eye, no aura  Occasional nausea   Takes prn Extra strength tylenol 1000 mg prn Excedrin 1000 mg     Chronic condition:  Crohn's dx:  Dx at age 19  On mercaptopurine 25 mg every other day  Hx of total colectomy and ileostomy bag w/ reversal after dx    Review of Systems   Constitutional:  Negative for activity change and unexpected weight change.   HENT:  Negative for hearing loss, rhinorrhea and trouble swallowing.    Eyes:  Negative for discharge and visual disturbance.   Respiratory:  Negative for chest tightness and wheezing.    Cardiovascular:  Negative for chest pain and palpitations.   Gastrointestinal:  Negative for blood in stool, constipation, diarrhea and vomiting.   Endocrine: Negative for polydipsia  and polyuria.   Genitourinary:  Negative for difficulty urinating, dysuria, hematuria and menstrual problem.   Musculoskeletal:  Negative for arthralgias, joint swelling and neck pain.   Neurological:  Positive for headaches. Negative for weakness.   Psychiatric/Behavioral:  Positive for dysphoric mood. Negative for confusion.          Objective:      Physical Exam    Assessment:       Problem List Items Addressed This Visit          Immunology/Multi System    Drug-induced immunodeficiency       GI    Crohn's disease     Other Visit Diagnoses       Migraine without status migrainosus, not intractable, unspecified migraine type    -  Primary    Relevant Medications    sumatriptan (IMITREX) 50 MG tablet            Plan:       Diagnoses and all orders for this visit:    Migraine without status migrainosus, not intractable, unspecified migraine type  -     sumatriptan (IMITREX) 50 MG tablet; Take 1 tablet (50 mg total) by mouth every 2 (two) hours as needed for Migraine.  Continue rotating tylenol/ibuprofen    Crohn's disease of small intestine without complication  Stable on medications, continue regimen    Drug-induced immunodeficiency  Stable

## 2024-11-29 DIAGNOSIS — G43.909 MIGRAINE WITHOUT STATUS MIGRAINOSUS, NOT INTRACTABLE, UNSPECIFIED MIGRAINE TYPE: ICD-10-CM

## 2024-11-29 NOTE — TELEPHONE ENCOUNTER
Refill Routing Note   Medication(s) are not appropriate for processing by [facility] for the following reason(s):        New or recently adjusted medication    [facility] action(s):  Defer             Appointments  past 12m or future 3m with PCP    Date Provider   Last Visit   10/22/2024 Fransisca Granados MD   Next Visit   Visit date not found Fransisca Granados MD   ED visits in past 90 days: 0        Note composed:7:19 AM 11/29/2024

## 2024-11-29 NOTE — TELEPHONE ENCOUNTER
No care due was identified.  [facility] Embedded Care Due Messages. Reference number: [id number].   11/29/2024 12:51:38 AM CST

## 2024-12-02 RX ORDER — SUMATRIPTAN 50 MG/1
TABLET, FILM COATED ORAL
Qty: 27 TABLET | Refills: 3 | Status: SHIPPED | OUTPATIENT
Start: 2024-12-02

## 2025-01-19 ENCOUNTER — PATIENT MESSAGE (OUTPATIENT)
Dept: GASTROENTEROLOGY | Facility: CLINIC | Age: 45
End: 2025-01-19
Payer: COMMERCIAL

## 2025-01-25 DIAGNOSIS — K50.10 CROHN'S DISEASE OF COLON WITHOUT COMPLICATION: Primary | ICD-10-CM

## 2025-01-27 ENCOUNTER — TELEPHONE (OUTPATIENT)
Dept: GASTROENTEROLOGY | Facility: CLINIC | Age: 45
End: 2025-01-27
Payer: COMMERCIAL

## 2025-01-27 DIAGNOSIS — K50.10 CROHN'S DISEASE OF COLON WITHOUT COMPLICATION: Primary | ICD-10-CM

## 2025-01-29 ENCOUNTER — TELEPHONE (OUTPATIENT)
Dept: GASTROENTEROLOGY | Facility: CLINIC | Age: 45
End: 2025-01-29
Payer: COMMERCIAL

## 2025-01-29 ENCOUNTER — LAB VISIT (OUTPATIENT)
Dept: LAB | Facility: HOSPITAL | Age: 45
End: 2025-01-29
Attending: STUDENT IN AN ORGANIZED HEALTH CARE EDUCATION/TRAINING PROGRAM
Payer: COMMERCIAL

## 2025-01-29 DIAGNOSIS — K50.10 CROHN'S DISEASE OF COLON WITHOUT COMPLICATION: ICD-10-CM

## 2025-01-29 LAB — HCG INTACT+B SERPL-ACNC: <2.4 MIU/ML

## 2025-01-29 PROCEDURE — 84702 CHORIONIC GONADOTROPIN TEST: CPT | Performed by: STUDENT IN AN ORGANIZED HEALTH CARE EDUCATION/TRAINING PROGRAM

## 2025-01-29 PROCEDURE — 36415 COLL VENOUS BLD VENIPUNCTURE: CPT | Performed by: STUDENT IN AN ORGANIZED HEALTH CARE EDUCATION/TRAINING PROGRAM

## 2025-01-29 NOTE — TELEPHONE ENCOUNTER
----- Message from Nabila sent at 1/29/2025  1:54 PM CST -----  Regarding: Cancelled Specimen  Good afternoon,       Order [id number] was cancelled per patient request. It was a registration check in error. Please place new orders. Patient has the specimen collection kit but will bring back before the end of the week.

## 2025-01-30 ENCOUNTER — PATIENT MESSAGE (OUTPATIENT)
Dept: GASTROENTEROLOGY | Facility: CLINIC | Age: 45
End: 2025-01-30
Payer: COMMERCIAL

## 2025-01-30 DIAGNOSIS — K50.10 CROHN'S DISEASE OF COLON WITHOUT COMPLICATION: Primary | ICD-10-CM

## 2025-01-30 DIAGNOSIS — K50.119 CROHN'S DISEASE OF COLON WITH COMPLICATION: Primary | ICD-10-CM

## 2025-01-30 RX ORDER — MERCAPTOPURINE 50 MG/1
25 TABLET ORAL EVERY OTHER DAY
Qty: 23 TABLET | Refills: 3 | Status: SHIPPED | OUTPATIENT
Start: 2025-01-30 | End: 2026-01-25

## 2025-06-18 DIAGNOSIS — Z12.31 OTHER SCREENING MAMMOGRAM: ICD-10-CM

## 2025-06-30 NOTE — PROGRESS NOTES
PMR New Patient History and Physical    Referred by:  Self referral    06/30/2025    Subjective  Lynda Reveles is a 44 y.o. female with past medical history Crohn's disease who presents with complaint of chronic left low back pain.     Onset/ Duration:  Ongoing for years, no specific injury, gradual onset  Progression:  Worse over the past few months  Pain described as: Stiffness, getting up from a chair, getting up from bed  Severity: On average 4-5/10, at worst 6/10  Location: left low back  Radiation: does not radiate down the legs. Sometimes gets some pain on the right side and developing some mild right hip pain and knee pain that she attributes to over compensation due to left-sided pain  Aggravators: Bending forward makes it worse  Numbness/ tingling: no  Weakness: no    Treatments tried and response  No physical therapy  Medications: on mercaptopurine for crohn's disease . Does not take regular medications for pain. Can't take ibuprofen due to Crohn's     Relevant functional activity history: works out, does strength training, does not bother her when working out usually but does avoid certain exercises. Working out 3-4 times a week, cardio, walking almost every day.       Past Medical History:   Diagnosis Date    Crohn disease        Past Surgical History:   Procedure Laterality Date    APPENDECTOMY  1/2000    this was just part of my subtotal colectomy, not a separate procedure    COLON SURGERY  1/10/2000    subtotal colectomy    SUBTOTAL COLECTOMY      with ileostomy reversal       Current Medications[1]    Review of patient's allergies indicates:   Allergen Reactions    Asacol [mesalamine]     Ciprofloxacin     Meperidine     Morphine        Social History     Socioeconomic History    Marital status: Single   Tobacco Use    Smoking status: Never    Smokeless tobacco: Never   Substance and Sexual Activity    Alcohol use: Yes     Alcohol/week: 5.0 standard drinks of alcohol     Types: 5 Drinks containing 0.5  oz of alcohol per week     Comment: social    Drug use: No    Sexual activity: Yes     Partners: Male     Birth control/protection: Other-see comments     Comment: birth control pill       Family history reviewed.     Review of Systems:  No weakness, bowel or bladder incontinence    Objective  Vitals:    07/01/25 0838   BP: 113/76   Pulse: 74       General: NAD, cooperative to exam  HEENT:  Normocephalic and atraumatic  Respiratory:  Nonlabored breathing, no respiratory distress  Cardiovascular:  No cyanosis or edema  Abdomen: benign  Skin: no lacerations or abrasions to exposed areas  Neurologic Exam: awake, alert and appropriate  Motor:   Lower Extremity Left Right   Hip flexion 5 5   Knee extension 5 5   Ankle dorsiflexion 5 5   Long toe extension 5 5   Ankle plantarflexion 5 5     Tone: normal  Bulk: normal without diffuse atrophy or hypertrophy  Involuntary movements: none    MSK:  Inspection: no redness or erythema to the lumbar spine, SI joint  Palpation:  Mildly tender over left SI joint.  Nontender over bilateral greater trochanter  ROM:  Full flexion, extension, lateral bending to the left causes some pain in the SI joint area  Special tests:  Positive Dilma finger sign.  Positive facet loading left-greater-than-right.  Negative DHRUV bilaterally, negative straight leg raise bilaterally, negative SI joint compression, SI joint distraction.  No pain with resisted hip abduction    Pertinent Labs:  09/13/2024: CRP normal  06/26/2024: A1c 5, creatinine 1.0    Imaging:  Prior imaging: no relevant imaging available for review  Today:  X-ray lumbar and SI joint ordered    Notes Reviewed:  None    Procedure:  No procedure performed during this visit.     Assessment and Plan:  Lynda was seen today for consult.    Diagnoses and all orders for this visit:    Chronic left-sided low back pain without sciatica  -     X-Ray Lumbar Spine Ap Lateral w/Flex Ext; Future  -     X-Ray Sacroiliac Joints 3 Views; Future  -      Ambulatory Referral/Consult to Physical Therapy          Lynda Reveles is a 44 y.o. female with history of Crohn's disease who presents to clinic today for evaluation and management of chronic left-sided low back pain.   - her pain seems to localize to the left SI joint, could also be discogenic problem in the lower lumbar spine  - given history of Crohn's, she is predisposed to inflammatory back pain  - she can not take oral anti-inflammatories due to Crohn's disease, discussed that she can try Voltaren gel on painful joints  - physical therapy referral placed today to work on strengthening exercises to support the spine, work on range of motion, develop home exercise program  - we will obtain x-ray lumbar spine and SI joint today.  If no significant concerns on maging, at next visit would consider obtaining CRP, ESR, HLA B27 and consider referral to Rheumatology.  Reviewed lab results and last CRP in Sept was normal      Follow up on or around: 3 months, follow up low back pain after PT    Mery Lagos  06/30/2025  [facility]                 [1]   Current Outpatient Medications:     escitalopram oxalate (LEXAPRO) 10 MG tablet, Take 10 mg by mouth once daily., Disp: , Rfl: 12    mercaptopurine (PURINETHOL) 50 mg tablet, Take 0.5 tablets (25 mg total) by mouth every other day., Disp: 23 tablet, Rfl: 3    norgestimate-ethinyl estradiol (ORTHO TRI-CYCLEN,TRI-SPRINTEC) 0.18/0.215/0.25 mg-35 mcg (28) tablet, TAKE 1 TABLET BY MOUTH EVERY DAY MUST MAKE DR APPOINTMENT, Disp: , Rfl: 2    sumatriptan (IMITREX) 50 MG tablet, TAKE 1 TABLET BY MOUTH EVERY 2 HOURS AS NEEDED FOR MIGRAINE., Disp: 27 tablet, Rfl: 3     There are no Wet Read(s) to document.

## 2025-07-01 ENCOUNTER — HOSPITAL ENCOUNTER (OUTPATIENT)
Dept: RADIOLOGY | Facility: HOSPITAL | Age: 45
Discharge: HOME | End: 2025-07-01
Attending: STUDENT IN AN ORGANIZED HEALTH CARE EDUCATION/TRAINING PROGRAM
Payer: COMMERCIAL

## 2025-07-01 ENCOUNTER — OFFICE VISIT (OUTPATIENT)
Dept: PHYSICAL MEDICINE AND REHAB | Facility: CLINIC | Age: 45
End: 2025-07-01
Payer: COMMERCIAL

## 2025-07-01 ENCOUNTER — RESULTS FOLLOW-UP (OUTPATIENT)
Dept: PHYSICAL MEDICINE AND REHAB | Facility: CLINIC | Age: 45
End: 2025-07-01

## 2025-07-01 VITALS — WEIGHT: 178 LBS | HEIGHT: 65 IN | BODY MASS INDEX: 29.66 KG/M2

## 2025-07-01 VITALS
DIASTOLIC BLOOD PRESSURE: 76 MMHG | BODY MASS INDEX: 29.68 KG/M2 | SYSTOLIC BLOOD PRESSURE: 113 MMHG | HEIGHT: 65 IN | HEART RATE: 74 BPM | WEIGHT: 178.13 LBS

## 2025-07-01 DIAGNOSIS — M54.50 CHRONIC LEFT-SIDED LOW BACK PAIN WITHOUT SCIATICA: ICD-10-CM

## 2025-07-01 DIAGNOSIS — Z12.31 OTHER SCREENING MAMMOGRAM: ICD-10-CM

## 2025-07-01 DIAGNOSIS — G89.29 CHRONIC LEFT-SIDED LOW BACK PAIN WITHOUT SCIATICA: ICD-10-CM

## 2025-07-01 DIAGNOSIS — M54.50 CHRONIC LEFT-SIDED LOW BACK PAIN WITHOUT SCIATICA: Primary | ICD-10-CM

## 2025-07-01 DIAGNOSIS — G89.29 CHRONIC LEFT-SIDED LOW BACK PAIN WITHOUT SCIATICA: Primary | ICD-10-CM

## 2025-07-01 PROCEDURE — 3074F SYST BP LT 130 MM HG: CPT | Mod: CPTII,S$GLB,, | Performed by: STUDENT IN AN ORGANIZED HEALTH CARE EDUCATION/TRAINING PROGRAM

## 2025-07-01 PROCEDURE — 72110 X-RAY EXAM L-2 SPINE 4/>VWS: CPT | Mod: TC

## 2025-07-01 PROCEDURE — 72200 X-RAY EXAM SI JOINTS: CPT | Mod: TC

## 2025-07-01 PROCEDURE — 77067 SCR MAMMO BI INCL CAD: CPT | Mod: TC

## 2025-07-01 PROCEDURE — 3008F BODY MASS INDEX DOCD: CPT | Mod: CPTII,S$GLB,, | Performed by: STUDENT IN AN ORGANIZED HEALTH CARE EDUCATION/TRAINING PROGRAM

## 2025-07-01 PROCEDURE — 72110 X-RAY EXAM L-2 SPINE 4/>VWS: CPT | Mod: 26,,, | Performed by: RADIOLOGY

## 2025-07-01 PROCEDURE — 1159F MED LIST DOCD IN RCRD: CPT | Mod: CPTII,S$GLB,, | Performed by: STUDENT IN AN ORGANIZED HEALTH CARE EDUCATION/TRAINING PROGRAM

## 2025-07-01 PROCEDURE — 99999 PR PBB SHADOW E&M-EST. PATIENT-LVL III: CPT | Mod: PBBFAC,,, | Performed by: STUDENT IN AN ORGANIZED HEALTH CARE EDUCATION/TRAINING PROGRAM

## 2025-07-01 PROCEDURE — 72200 X-RAY EXAM SI JOINTS: CPT | Mod: 26,,, | Performed by: RADIOLOGY

## 2025-07-01 PROCEDURE — 99204 OFFICE O/P NEW MOD 45 MIN: CPT | Mod: S$GLB,,, | Performed by: STUDENT IN AN ORGANIZED HEALTH CARE EDUCATION/TRAINING PROGRAM

## 2025-07-01 PROCEDURE — 3078F DIAST BP <80 MM HG: CPT | Mod: CPTII,S$GLB,, | Performed by: STUDENT IN AN ORGANIZED HEALTH CARE EDUCATION/TRAINING PROGRAM

## 2025-07-15 ENCOUNTER — CLINICAL SUPPORT (OUTPATIENT)
Dept: REHABILITATION | Facility: OTHER | Age: 45
End: 2025-07-15
Payer: COMMERCIAL

## 2025-07-15 DIAGNOSIS — M54.59 MECHANICAL LOW BACK PAIN: Primary | ICD-10-CM

## 2025-07-15 DIAGNOSIS — R68.89 DECREASED STRENGTH, ENDURANCE, AND MOBILITY: ICD-10-CM

## 2025-07-15 DIAGNOSIS — R53.1 DECREASED STRENGTH, ENDURANCE, AND MOBILITY: ICD-10-CM

## 2025-07-15 DIAGNOSIS — Z74.09 DECREASED STRENGTH, ENDURANCE, AND MOBILITY: ICD-10-CM

## 2025-07-15 PROCEDURE — 97530 THERAPEUTIC ACTIVITIES: CPT | Mod: PN

## 2025-07-15 PROCEDURE — 97140 MANUAL THERAPY 1/> REGIONS: CPT | Mod: PN

## 2025-07-15 PROCEDURE — 97162 PT EVAL MOD COMPLEX 30 MIN: CPT | Mod: PN

## 2025-07-15 NOTE — PROGRESS NOTES
"Outpatient Rehab    Physical Therapy Evaluation         Name: Lynda Reveles  Clinic Number: [id number]    Therapy Diagnosis:   Encounter Diagnoses   Name Primary?    Mechanical low back pain Yes    Decreased strength, endurance, and mobility         Physician: Mery Lagos MD    Physician Orders: PT Eval and Treat <> PT L spine (Diagnosis: chronic left side low back pain),   Treatment:  - ROM and gentle stretching exercises to restore lumbar motion  - core and hip girdle stabilization exercises  - development of home exercise program    Medical Diagnosis from Referral: M54.50,G89.29 (ICD-10-CM) - Chronic left-sided low back pain without sciatica  Evaluation Date: 7/15/2025  Authorization Period Expiration: 07/01/2026  Plan of Care Expiration: 7/15/2025 - 9/15/2025  Progress Note Due: 8/15/2025  Date of Surgery: n/a  Visit # / Visits authorized: 1/ 1   FOTO: 1/ 3    Precautions: Standard, scoliosis, Chron's     Time In: 2:00p  Time Out: 3:00p  Total Billable Time: 60 minutes    Subjective     Date of onset: chronic, "years"    History of current condition - Lynda reports: Insidious onset of left low back pain that started several months ago and gradually gotten worse. Transitional movements such as sit to stands, prolonged standing, Bending over with HHCs, RDLs (hip hinge) increase her pain. She denies changes in workouts over the last several years but reports feeling that her range of what she used to do (either ROM or amt lifted) was more than now.    Occasionally has "dully achy" pain in the Right low back with walking or standing. Reports that this pain "feels different than the pain on the L side and does not get stiff like the Left." Denies leg pain although has a Hx of it in the past.    Pt denies drop foot, change of B/B control, saddle paresthesias, unexplained weight gain/loss, fever, chills or night sweats.       Falls: none     Imaging:   bone scan films, SIJ/pelvis, 2025: FINDINGS:  SI joints appear " unremarkable, with no evidence of abnormal marginal irregularity, sclerosis, or ankylosis observed on either side.  No evidence of recent or healing fracture or lytic destructive process involving the visualized osseous structures is appreciated.  Some vertebral endplate spurring at L4-5 is noted.    bone scan films, [facility], 2025: FINDINGS:  Scoliosis convex the left in the lumbar region is appreciated, with a rotatory component in the upper lumbar area noted.  Alignment is otherwise unremarkable, and there is no evidence of significant translational instability demonstrated at any lumbar or visualized lower thoracic level on the dynamic weight-bearing flexion/extension images.  Vertebral body heights are normally maintained, without compression deformity at any level.  There is mild disc narrowing and some marginal vertebral endplate spurring observed at the L4-5 level.  The other disc heights appear well maintained.  Vertebral endplates are well defined.  No osseous destructive process.  SI joints appear unremarkable.  Some surgical sutures are seen in the abdomen, particularly to the left of midline at L1-3.     Impression:     1. Lumbar levoscoliosis.  2. Mild disc narrowing and some marginal vertebral endplate spurring at L4-5.  3. No evidence of compression fracture.  No translational instability.      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 Working out 3-4 times a week, cardio, walking almost every day.   Prior Level of Function: indep  Current Level of Function: pain and difficulty with all functional activities    Pain:  Current 5/10, worst 6/10, best 4/10   Location: left low back  Description: Aching, Dull, Tight, and stiff  Aggravating  "Factors: transitional movements = Walking, Getting out of bed/chair, and stairs; also with bending forwards with lifting or reaching activities, including washing dishes or making the bed  Easing Factors: rest and OTC meds prn, activity modifications    Patients goals: reduce sx, return to work and exercise w/o pain     Medical History:   Past Medical History:   Diagnosis Date    Crohn disease        Surgical History:   Lynda Reveles  has a past surgical history that includes Subtotal colectomy; Appendectomy (1/2000); and Colon surgery (1/10/2000).    Medications:   Lynda has a current medication list which includes the following prescription(s): mercaptopurine, norgestimate-ethinyl estradiol, and sumatriptan.    Allergies:   Review of patient's allergies indicates:   Allergen Reactions    Asacol [mesalamine]     Ciprofloxacin     Meperidine     Morphine         Objective      Postural examination/scapula alignment: mild FH/RSP, scoliotic curve with L convexity, mild increase in anterior innominate rotation  (L)  Sensory deficit: none  Reflexes:    Left Right   Patella Tendon 2+ 2+   Achilles Tendon 2+ 2+   Babinski  (--) (--)   Clonus (--) (--)       Thoracic/Lumbar AROM: Pain/Dysfunction with Movement:   Flexion WFL, fingertips to mid tibia, pain in L back   Extension Mod hypo, pinch pain on L low back   Right side bending Mod hypo, pain (L)   Left side bending Mod-min hypo, pain (L)   Right rotation Min hypo, painful   Left rotation Mod hypo, painful     Trunk strength:   Extensors (reverse Hyperext) = poor, <30" with pain   Abdominals (fwd / side plank) = poor, <30" with pain   Double leg lowering (ab strength/motor control) = pain at 60 deg elevation    Hip ROM: WFL  Knee ROM: WNL    Lower Extremity Strength  Right LE  Left LE    Hip flexion: 4/5 Hip flexion: 4/5   Hip extension:  4/5 - knee ext  4-/5 - knee flex Hip extension: 4-/5 - knee ext  3+/5 - knee flex   Hip abduction: 4/5 Hip abduction: 4-/5   Hip " "adduction:  4+/5 Hip adduction:  4+/5   Hip Internal rotation   4+/5 Hip Internal rotation 4/5   Knee Flexion 5/5 Knee Flexion 5/5   Knee Extension 5/5 Knee Extension 5/5   Ankle dorsiflexion: 5/5 Ankle dorsiflexion: 5/5   Ankle plantarflexion: 5/5 Ankle plantarflexion: 5/5     Flexibility:   Jean test: Hip flexors: WFL  Ely's: Quads: WFL  Yg test: ITB: WNL  Hamstring (SLR): WNL    Joint Mobility: mild hypo L>R L2-S1 RR/LR glides    Special Tests:   Test Name  Test Result   Prone Instability Test (--)   Lumbar Quadrant test (--)   Straight Leg Raise (--) for neural tension, (+) for LSP instability, improved with external pelvic support   Neural Tension Test (Slump) (--)   Crossed Straight Leg Raise (--)   Walking on toes (--)   Walking on heels  (--)   Clonus (--)   DHRUV (+)   FADIR (+)   SI Joint Provocation Test (compression / distraction) (--) but (+) with posterior shear test     Functional Movement Analysis:   Gait: I  Bed mobility: I  Sit<>stand t/f: I  Bed mobility: I  DL squat: FP  SL squat: DP  Stairs: I, mild contralat pelvic drop in L SLS    Balance: SLS x 10" with min-no sway      Intake Outcome Measure for FOTO LSP Survey    Therapist reviewed FOTO scores for Lynda Reveles on 7/15/2025.   FOTO report - see Media section or FOTO account episode details.    Intake Score: 65%         Treatment     Total Treatment time (time-based codes) separate from Evaluation: 31 minutes     Lynda received the treatments listed below:      therapeutic exercises to develop strength, endurance, ROM, flexibility, posture, and core stabilization for 00 minutes including:  None today    manual therapy techniques: Joint mobilizations, Manual traction, Myofacial release, and Soft tissue Mobilization were applied to the: LSP for 8 minutes, including:  SIJ manip prone x2, SL x2    neuromuscular re-education activities to improve: Balance, Coordination, Kinesthetic, Sense, Proprioception, and Posture for 00 minutes. The " "following activities were included:  None today.  Progress TA and multifidi activation nv.  Initiate progressive glute med/max stabilization nv.    therapeutic activities to improve functional performance for 23  minutes, including:  Pt edu on dx, pathogenesis, prognosis, PT POC.  Pt edu on HEP, importance of compliance and consistency for sx modulation and ADL tolerance:  TA activation x10x10"  TA + BKFO x15 B  Prone glute set x10x10"  Prone butt winks (2<>1)x20  Prone glute set into hip ext x15x5"       Patient Education and Home Exercises     Education provided:   Patient educated regarding surgical procedure, pathogenesis, diagnosis, protocol, prognosis, POC, and HEP, including use of visual assistance for understanding of anatomy and dysfunction. Written Home Exercises Provided with written and verbal instructions for frequency and duration of the following exercises: see list above. Pt educated on HEP and activity modifications to reduce c/o pain and improve overall function.     Patient received education regarding proper posture and body mechanics. Grey roll tried, recommended, and purchase information was provided. Heavy emphasis on upright posture while sitting to improve spinal alignment, restore lumbar curvature, and reduce stress/strain on cervical spine. Continued to recommend pt perform HEP intermittently throughout the day to reduce c/o stiffness and pain, particularly cervical retractions while sitting in car with TTC from headrest. Pt also educated on use of modalities prn to reduce c/o pain and dysfunction. Lynda caballero good understanding of the education provided. Lynda  demonstrated good return demonstration of activities.     Pt also educated on use of modalities prn to reduce c/o pain and dysfunction.     Pt educated on clinic's cancellation/no-show policy of missing 3 consecutive PT appointments, which will result in an automatic discharge from therapy services 2* to non-compliance, unless " otherwise stated.     Patient demo good understanding of the education provided. Patient demo good return demo of skill of exercises.      Written Home Exercises Provided: Yes. Exercises were reviewed and Lynda was able to demonstrate them prior to the end of the session.  Lynda demonstrated good  understanding of the education provided. See EMR under Patient Instructions for exercises provided during therapy sessions.    Assessment     Lynda is a 45 y.o. female referred to outpatient Physical Therapy with a medical diagnosis of M54.50,G89.29 (ICD-10-CM) - Chronic left-sided low back pain without sciatica. Patient presents with marked limitations in ROM, joint and myofascial mobility, flexibility, strength, postural awareness/endurance, motor control and coordination. S/s associated with referring diagnosis, with lumbopelvic instability contributing to c/o pain. Impairments limit pt with all functional activities including work, recreational, and HHCs.      Patient prognosis is Good.   Patient will benefit from skilled outpatient Physical Therapy to address the deficits stated above and in the chart below, provide patient /family education, and to maximize patientt's level of independence.     Plan of care discussed with patient: Yes  Patient's spiritual, cultural and educational needs considered and patient is agreeable to the plan of care and goals as stated below:     Anticipated Barriers for therapy: standard    Medical Necessity is demonstrated by the following  History  Co-morbidities and personal factors that may impact the plan of care [] LOW: no personal factors / co-morbidities  [x] MODERATE: 1-2 personal factors / co-morbidities  [] HIGH: 3+ personal factors / co-morbidities    Moderate / High Support Documentation:   Co-morbidities affecting plan of care: scoliosis    Personal Factors:   no deficits     Examination  Body Structures and Functions, activity limitations and participation restrictions that may  impact the plan of care [] LOW: addressing 1-2 elements  [] MODERATE: 3+ elements  [x] HIGH: 4+ elements (please support below)    Moderate / High Support Documentation: ROM, joint and myofascial mobility, flexibility, strength, endurance, posture, motor control/coordination, HHCs, self care, work, driving, community amb and participation, sport     Clinical Presentation [] LOW: stable  [x] MODERATE: Evolving  [] HIGH: Unstable     Decision Making/ Complexity Score: moderate       Goals:  Short Term Goals (4 Weeks):   1. Pt will report 20% reduction in pain of the lumbar spine and LE for ease with ADL's  2. PT will demonstrate improved trunk strength by a half grade in for ease with upright posture during standing activities.  3. Pt will demonstrate improved lumbar spine ROM in all directions by a half grade for ease with bending activities.       Long Term Goals (8 Weeks):   1. Pt will report being independent with HEP for maintenance of improvements gained during therapy sessions  2. PT will report 50% reduction of pain of the back and LE for ease with dressing and grooming activities.   3. Pt will demonstrate trunk and extremity strength to >=4+/5 without the provocation of pain for ease with household chores  4. Pt will demonstrate appropriate upright posture without external cueing for ease with work related activities.       Plan     Plan of care Certification: 7/15/2025 to 9/15/2025.    Outpatient Physical Therapy 2 times weekly for 8 weeks to include the following interventions: Aquatic Therapy, Cervical/Lumbar Traction, Electrical Stimulation prn, Gait Training, Iontophoresis (with dexamethasone prn), Manual Therapy, Moist Heat/ Ice, Neuromuscular Re-ed, Patient Education, Self Care, Therapeutic Activities, and Therapeutic Exercise. Progress HEP towards D/C. Recommend F/U with MD if symptoms worsen or do not resolve. Patient may be seen by a PTA for treatment to carry out their plan of care.  Face-to-face  conferences will be held.      Rama Osullivan, PT        Physician's Signature: _________________________________________ Date: ________________

## 2025-07-21 PROBLEM — Z74.09 DECREASED STRENGTH, ENDURANCE, AND MOBILITY: Status: ACTIVE | Noted: 2025-07-21

## 2025-07-21 PROBLEM — R53.1 DECREASED STRENGTH, ENDURANCE, AND MOBILITY: Status: ACTIVE | Noted: 2025-07-21

## 2025-07-21 PROBLEM — M54.59 MECHANICAL LOW BACK PAIN: Status: ACTIVE | Noted: 2025-07-21

## 2025-07-21 PROBLEM — R68.89 DECREASED STRENGTH, ENDURANCE, AND MOBILITY: Status: ACTIVE | Noted: 2025-07-21

## 2025-07-22 ENCOUNTER — CLINICAL SUPPORT (OUTPATIENT)
Dept: REHABILITATION | Facility: OTHER | Age: 45
End: 2025-07-22
Payer: COMMERCIAL

## 2025-07-22 DIAGNOSIS — R53.1 DECREASED STRENGTH, ENDURANCE, AND MOBILITY: ICD-10-CM

## 2025-07-22 DIAGNOSIS — Z74.09 DECREASED STRENGTH, ENDURANCE, AND MOBILITY: ICD-10-CM

## 2025-07-22 DIAGNOSIS — M54.59 MECHANICAL LOW BACK PAIN: Primary | ICD-10-CM

## 2025-07-22 DIAGNOSIS — R68.89 DECREASED STRENGTH, ENDURANCE, AND MOBILITY: ICD-10-CM

## 2025-07-22 PROCEDURE — 97530 THERAPEUTIC ACTIVITIES: CPT | Mod: PN

## 2025-07-22 PROCEDURE — 97112 NEUROMUSCULAR REEDUCATION: CPT | Mod: PN

## 2025-07-22 PROCEDURE — 97140 MANUAL THERAPY 1/> REGIONS: CPT | Mod: PN

## 2025-07-22 NOTE — PROGRESS NOTES
[facility]   Physical Therapy Treatment Note      Name: Lynda Reveles  Clinic Number: [id number]    Therapy Diagnosis:   Encounter Diagnoses   Name Primary?    Mechanical low back pain Yes    Decreased strength, endurance, and mobility      Physician: Mery Lagos MD    Visit Date: 7/22/2025    Physician Orders: PT Eval and Treat <> PT L spine (Diagnosis: chronic left side low back pain),   Treatment:  - ROM and gentle stretching exercises to restore lumbar motion  - core and hip girdle stabilization exercises  - development of home exercise program     Medical Diagnosis from Referral: M54.50,G89.29 (ICD-10-CM) - Chronic left-sided low back pain without sciatica  Evaluation Date: 7/15/2025  Authorization Period Expiration: 07/01/2026  Plan of Care Expiration: 7/15/2025 - 9/15/2025  Progress Note Due: 8/15/2025  Date of Surgery: n/a  Visit # / Visits authorized: 2/ 1   FOTO: 1/ 3     Precautions: Standard, scoliosis, Chron's      Time In: 11:00a  Time Out: 12:00p  Total Billable Time: 60 minutes    PTA Visit #: 0/5       Subjective     Patient reports: coming to PT late today as the clinic was originally closed due to emergency. Says that she has been keeping up with HEP with good tolerance. Sx in L low back remain fairly unchanged.  She was compliant with home exercise program.  Response to previous treatment: good  Functional change: ongoing    Pain: 5/10  Location: left low back    Objective      Objective Measures updated at progress report unless specified.     Treatment     Lynda received the treatments listed below:      therapeutic exercises to develop strength, endurance, ROM, flexibility, posture, and core stabilization for 00 minutes including:  None today     manual therapy techniques: Joint mobilizations, Manual traction, Myofacial release, and Soft tissue Mobilization were applied to the: LSP for 10 minutes, including:  SIJ manip prone x2, SL x2  MET to LSP in R SL  "x5x5"    neuromuscular re-education activities to improve: Balance, Coordination, Kinesthetic, Sense, Proprioception, and Posture for 35 minutes. The following activities were included:  TA x10x10"  TA + BKFO x3x10    Qped multifidi hip lift x 20 x 5" holds  Qped hip ext x20 x 5" holds -- arms supported on step to reduce wrist pain    Bridge with band -- attempted but UA 2* pain  Side plank w/ clam x2x20    therapeutic activities to improve functional performance for 15  minutes, including:  Reassessment   Pt edu on exercise regimen at home w/ addition of HEP, HEP progressions/modifications  Side stepping x 4 x 40 ft x green loop at forefoot, arms flexed w/ 3# DB  Step up w/ contralat pull down x20 B x 3# on freemotion, 6" step        Patient Education and Home Exercises       Education provided:   - 7/22/2025 - added side plank with clam to HEP - 2x20 B <> AROM and then progress resistance    Written Home Exercises Provided: Pt instructed to continue prior HEP. Exercises were reviewed and Lynda was able to demonstrate them prior to the end of the session.  Lynda demonstrated good  understanding of the education provided. See Electronic Medical Record under Patient Instructions for exercises provided during therapy sessions    Assessment     As pt presents with continued LBP on the L with joint stiffness in L SIJ and LSP, resumed MPT today with added c/r MET for LSP; noted improvement in AROM with trunk flex but c/o pain remained the same - mainly with initiating flexion ROM. As s/s might indicate lower LSP instability with initiating transitional movements, progressed core and multifidus strengthening today. Progressed upright functional training today with mod fatigue and min c/o pain.     Lynda Is progressing well towards her goals.   Patient prognosis is Good.     Patient will continue to benefit from skilled outpatient physical therapy to address the deficits listed in the problem list box on initial evaluation, " provide pt/family education and to maximize pt's level of independence in the home and community environment.     Patient's spiritual, cultural and educational needs considered and pt agreeable to plan of care and goals.     Anticipated barriers to physical therapy: standard    Goals:   Short Term Goals (4 Weeks):   1. Pt will report 20% reduction in pain of the lumbar spine and LE for ease with ADL's  2. PT will demonstrate improved trunk strength by a half grade in for ease with upright posture during standing activities.  3. Pt will demonstrate improved lumbar spine ROM in all directions by a half grade for ease with bending activities.         Long Term Goals (8 Weeks):   1. Pt will report being independent with HEP for maintenance of improvements gained during therapy sessions  2. PT will report 50% reduction of pain of the back and LE for ease with dressing and grooming activities.   3. Pt will demonstrate trunk and extremity strength to >=4+/5 without the provocation of pain for ease with household chores  4. Pt will demonstrate appropriate upright posture without external cueing for ease with work related activities.   Plan     Cont with POC for pelvic, trunk, core strength, motor control/coordination.  POC ends 9/15/2025    Rama Osullivan, PT

## 2025-07-24 ENCOUNTER — CLINICAL SUPPORT (OUTPATIENT)
Dept: REHABILITATION | Facility: OTHER | Age: 45
End: 2025-07-24
Payer: COMMERCIAL

## 2025-07-24 ENCOUNTER — PATIENT MESSAGE (OUTPATIENT)
Dept: REHABILITATION | Facility: OTHER | Age: 45
End: 2025-07-24

## 2025-07-24 DIAGNOSIS — R68.89 DECREASED STRENGTH, ENDURANCE, AND MOBILITY: ICD-10-CM

## 2025-07-24 DIAGNOSIS — M54.59 MECHANICAL LOW BACK PAIN: Primary | ICD-10-CM

## 2025-07-24 DIAGNOSIS — Z74.09 DECREASED STRENGTH, ENDURANCE, AND MOBILITY: ICD-10-CM

## 2025-07-24 DIAGNOSIS — R53.1 DECREASED STRENGTH, ENDURANCE, AND MOBILITY: ICD-10-CM

## 2025-07-24 PROCEDURE — 97112 NEUROMUSCULAR REEDUCATION: CPT | Mod: PN

## 2025-07-24 PROCEDURE — 97530 THERAPEUTIC ACTIVITIES: CPT | Mod: PN

## 2025-07-24 NOTE — PROGRESS NOTES
[facility]   Physical Therapy Treatment Note      Name: Lynda Reveles  Clinic Number: [id number]    Therapy Diagnosis:   Encounter Diagnoses   Name Primary?    Mechanical low back pain Yes    Decreased strength, endurance, and mobility        Physician: Mery Lagos MD    Visit Date: 7/24/2025    Physician Orders: PT Eval and Treat <> PT L spine (Diagnosis: chronic left side low back pain),   Treatment:  - ROM and gentle stretching exercises to restore lumbar motion  - core and hip girdle stabilization exercises  - development of home exercise program     Medical Diagnosis from Referral: M54.50,G89.29 (ICD-10-CM) - Chronic left-sided low back pain without sciatica  Evaluation Date: 7/15/2025  Authorization Period Expiration: 07/01/2026  Plan of Care Expiration: 7/15/2025 - 9/15/2025  Progress Note Due: 8/15/2025  Date of Surgery: n/a  Visit # / Visits authorized: 4/ 1   FOTO: 1/ 3     Precautions: Standard, scoliosis, Chron's      Time In: 3:15p  Time Out: 4:15p  Total Billable Time: 60 minutes    PTA Visit #: 0/5       Subjective     Patient reports: coming to PT late today as the clinic was originally closed due to emergency. Says that she has been keeping up with HEP with good tolerance. Sx in L low back remain fairly unchanged.  She was compliant with home exercise program.  Response to previous treatment: good  Functional change: ongoing    Pain: 5/10  Location: left low back    Objective      Objective Measures updated at progress report unless specified.     Treatment     Lynda received the treatments listed below:      therapeutic exercises to develop strength, endurance, ROM, flexibility, posture, and core stabilization for 00 minutes including:  None today     manual therapy techniques: Joint mobilizations, Manual traction, Myofacial release, and Soft tissue Mobilization were applied to the: LSP for 00 minutes, including:  SIJ manip prone x2, SL x2  MET to LSP in R SL  "x5x5"    neuromuscular re-education activities to improve: Balance, Coordination, Kinesthetic, Sense, Proprioception, and Posture for 20 minutes. The following activities were included:  TA x10x10"  TA + BKFO x3x10 -- changed to heel slides today, with simultaneous ball press to improve TA activation  +mod side plank w/ hip abd x3x10 B  +bridge with cook band pull down x3x10 x orange band + ball squeeze at knees    Qped multifidi hip lift x 20 x 5" holds  Qped hip ext x20 x 5" holds -- arms supported on step to reduce wrist pain    Bridge with band -- attempted but UA 2* pain  Side plank w/ clam x2x20    therapeutic activities to improve functional performance for 40 minutes, including:  Pt edu on exercise progression, need for dynamic lumbopelvic stability, and mm slings for multidirectional trunk engagement.  +SL squat with OH Y hold x3x8 x YTB  Side stepping x 2 x 60 ft x red loop at forefoot, arms flexed w/ 3# DB  Step up w/ contralat pull down x1x15 B x 7# on freemotion, 8" step  +stationary lunge w/ anti rotation (outside leg FWD) x2x5 B x orange power band  +anti rotations x2x5 B x orange power band    Patient Education and Home Exercises       Education provided:   - 7/22/2025 - added side plank with clam to HEP - 2x20 B <> AROM and then progress resistance    Written Home Exercises Provided: Pt instructed to continue prior HEP. Exercises were reviewed and Lynda was able to demonstrate them prior to the end of the session.  Lynda demonstrated good  understanding of the education provided. See Electronic Medical Record under Patient Instructions for exercises provided during therapy sessions    Assessment     Modified program with progressions including heavy emphasis on crossed myofascial sling patterns to promote mm strength and myofascial stability for LSP instability. Good tolerance with mod fatigue but appropriate training effect noted. Consider progressions for next visit.  Lynda Is progressing well " towards her goals.   Patient prognosis is Good.     Patient will continue to benefit from skilled outpatient physical therapy to address the deficits listed in the problem list box on initial evaluation, provide pt/family education and to maximize pt's level of independence in the home and community environment.     Patient's spiritual, cultural and educational needs considered and pt agreeable to plan of care and goals.     Anticipated barriers to physical therapy: standard    Goals:   Short Term Goals (4 Weeks):   1. Pt will report 20% reduction in pain of the lumbar spine and LE for ease with ADL's  2. PT will demonstrate improved trunk strength by a half grade in for ease with upright posture during standing activities.  3. Pt will demonstrate improved lumbar spine ROM in all directions by a half grade for ease with bending activities.         Long Term Goals (8 Weeks):   1. Pt will report being independent with HEP for maintenance of improvements gained during therapy sessions  2. PT will report 50% reduction of pain of the back and LE for ease with dressing and grooming activities.   3. Pt will demonstrate trunk and extremity strength to >=4+/5 without the provocation of pain for ease with household chores  4. Pt will demonstrate appropriate upright posture without external cueing for ease with work related activities.   Plan     Cont with POC for pelvic, trunk, core strength, motor control/coordination.  POC ends 9/15/2025    Rama Osullivan, PT

## 2025-07-25 ENCOUNTER — OFFICE VISIT (OUTPATIENT)
Dept: OBSTETRICS AND GYNECOLOGY | Facility: CLINIC | Age: 45
End: 2025-07-25
Payer: COMMERCIAL

## 2025-07-25 VITALS
BODY MASS INDEX: 29.72 KG/M2 | WEIGHT: 178.38 LBS | SYSTOLIC BLOOD PRESSURE: 116 MMHG | DIASTOLIC BLOOD PRESSURE: 74 MMHG | HEIGHT: 65 IN

## 2025-07-25 DIAGNOSIS — Z71.85 HPV VACCINE COUNSELING: ICD-10-CM

## 2025-07-25 DIAGNOSIS — F32.81 PMDD (PREMENSTRUAL DYSPHORIC DISORDER): ICD-10-CM

## 2025-07-25 DIAGNOSIS — Z30.41 ENCOUNTER FOR SURVEILLANCE OF CONTRACEPTIVE PILLS: ICD-10-CM

## 2025-07-25 DIAGNOSIS — Z01.419 ENCOUNTER FOR ANNUAL ROUTINE GYNECOLOGICAL EXAMINATION: Primary | ICD-10-CM

## 2025-07-25 DIAGNOSIS — E66.3 OVERWEIGHT (BMI 25.0-29.9): ICD-10-CM

## 2025-07-25 DIAGNOSIS — Z12.31 BREAST CANCER SCREENING BY MAMMOGRAM: ICD-10-CM

## 2025-07-25 DIAGNOSIS — Z30.09 ENCOUNTER FOR OTHER GENERAL COUNSELING OR ADVICE ON CONTRACEPTION: ICD-10-CM

## 2025-07-25 PROBLEM — G43.909 MIGRAINE HEADACHE: Status: ACTIVE | Noted: 2025-07-25

## 2025-07-25 PROBLEM — R63.5 ABNORMAL WEIGHT GAIN: Status: ACTIVE | Noted: 2024-06-07

## 2025-07-25 PROBLEM — F41.8 MIXED ANXIETY AND DEPRESSIVE DISORDER: Status: ACTIVE | Noted: 2024-06-07

## 2025-07-25 PROBLEM — Z87.19 HISTORY OF PANCREATITIS: Status: ACTIVE | Noted: 2024-06-07

## 2025-07-25 PROCEDURE — 99999 PR PBB SHADOW E&M-EST. PATIENT-LVL III: CPT | Mod: PBBFAC,,, | Performed by: OBSTETRICS & GYNECOLOGY

## 2025-07-25 RX ORDER — DESOGESTREL AND ETHINYL ESTRADIOL 21-5 (28)
1 KIT ORAL DAILY
Qty: 112 TABLET | Refills: 3 | Status: SHIPPED | OUTPATIENT
Start: 2025-07-25 | End: 2026-07-25

## 2025-07-25 RX ORDER — NORGESTIMATE AND ETHINYL ESTRADIOL 7DAYSX3 28
1 KIT ORAL
COMMUNITY
Start: 2025-06-16 | End: 2025-07-25 | Stop reason: ALTCHOICE

## 2025-07-25 NOTE — PROGRESS NOTES
Chief Complaint: Well Woman Exam     HPI:      Lynda Reveles is a 45 y.o. No obstetric history on file. who presents for annual exam. She is currently complaining of:    Weight gain - 30 pound/1-2 years that occurred approx 3 years ago, weight has been stable over the past year.  Had been on Lexapro for many years prior to weight gain.     Heat intolerance - feels hot all the time    Increased irritability - that occurred after discontinuing Lexapro    Denies night sweats.     Has noted increased irritability and anxiety in week to two prior to menses.     Has a history of menstrual migraine headaches.  Notes migraines that occur during week of placebo pills. Using Imitrex every 4-8 weeks.     Ms. Reveles is currently sexually active with a single male partner. She declines STD screening today. Patient has regular monthly menses. Patient's last menstrual period was 07/17/2025. She is currently using oral contraceptives (estrogen/progesterone) for contraception.    Previous Pap:   INTERPRETATION Negative for intraepithelial lesion or malignancy   Electronically signed by Edith Nye on 2/28/2023 at 10:35 AM       Ref Range & Units 2 yr ago Comments    HPV mRNA E6/E7 - [facility] Not Detected Not Detected Methodology: Transcription-Mediated Amplification    This assay detects E6/E7 viral messenger RNA (mRNA) from 14  high-risk HPV types (16,18,31,33,35,39,45,51,52,56,58,59,66,68).      Cervical sources are required for HPV testing.  If a vaginal source from a patient who has had a  total hysterectomy with removal of cervix was  submitted, please contact the testing laboratory  for alternative testing options.    For additional information, please refer to  http://[email or web address]/faq/DNW751n1  (This link if provided for information/  educational purposes only.)   Resulting Agency  [facility]    Specimen Collected: 02/22/23 10:57    Performed by: KAY Last Resulted: 02/25/23 19:01   Received  From: [facility]  Result Received: 06/17/24 12:24     Previous Mammogram:   Results for orders placed during the hospital encounter of 07/01/25    Mammo Digital Screening Bilat w/ Price (XPD)    Narrative  Facility:  [facility]  [facility] [address]    Name: Lynda Reveles    MRN: [id number]    Result:  Mammo Digital Screening Bilat w/ Price (XPD)    History:  Patient is 45 y.o. and is seen for a screening mammogram.      Films Compared:  Compared to: 06/05/2024 Mammo Previous, 03/11/2023 Mammo Previous, and 03/23/2018 Mammo Previous    Findings:  This procedure was performed using tomosynthesis.  Computer-aided detection was utilized in the interpretation of this examination.    The breasts are heterogeneously dense, which may obscure small masses. There is no evidence of suspicious masses, microcalcifications or architectural distortion.    Impression  No mammographic evidence of malignancy.    BI-RADS Category 1: Negative    Recommendation:  Routine screening mammogram in 1 year is recommended.    Your estimated lifetime risk of breast cancer (to age 85) based on Tyrer-Cuzick risk assessment model is 12.41%.  According to the American Cancer Society, patients with a lifetime breast cancer risk of 20% or higher might benefit from supplemental screening tests, such as screening breast MRI.    Electronically signed by,  Dayna Shaikh MD     Most Recent Dexa: not indicated  Colonoscopy: overdue    COVID vaccine: completed series  Gardasil:Has never had     Problem List[1]    Past Medical History:   Diagnosis Date    Crohn disease        Past Surgical History:   Procedure Laterality Date    APPENDECTOMY  1/2000    this was just part of my subtotal colectomy, not a separate procedure    SUBTOTAL COLECTOMY  01/10/2000    with ileostomy reversal       OB History    No obstetric history on file.         ROS:     Review of Systems   Constitutional:  Positive for unexpected  "weight change. Negative for activity change and fatigue.   Respiratory:  Negative for shortness of breath.    Cardiovascular:  Negative for chest pain.   Gastrointestinal:  Negative for abdominal pain, blood in stool, constipation and diarrhea.   Endocrine: Positive for heat intolerance. Negative for cold intolerance.   Genitourinary:  Negative for bladder incontinence, dyspareunia, dysuria, frequency, hot flashes, vaginal bleeding and vaginal dryness.   Integumentary:  Negative for breast mass, breast discharge and breast tenderness.   Psychiatric/Behavioral:  Negative for dysphoric mood. The patient is not nervous/anxious.    Breast: Negative for mass and tenderness      Physical Exam:      PHYSICAL EXAM:  /74   Ht 5' 5" (1.651 m)   Wt 80.9 kg (178 lb 5.6 oz)   LMP 07/17/2025   BMI 29.68 kg/m²   Body mass index is 29.68 kg/m².     APPEARANCE: Well nourished, well developed, in no acute distress.  PSYCH: Appropriate mood and affect.  SKIN: No acne or hirsutism  NECK: Neck symmetric without masses or thyromegaly  NODES: No inguinal, axillary, or supraclavicular lymph node enlargement  CHEST: Normal respiratory effort.  ABDOMEN: Soft.  No tenderness or masses.   BREASTS: Symmetrical, no skin changes or visible lesions.  No palpable masses or nipple discharge bilaterally.  PELVIC: Normal external genitalia without lesions.  Normal hair distribution.  Adequate perineal body, normal urethral meatus.  Vagina moist and well rugated without lesions or discharge.  Cervix pink, without lesions, discharge or tenderness.  No significant cystocele or rectocele.  Bimanual exam shows uterus to be normal size, regular, mobile and nontender.  Adnexa without masses or tenderness.    EXTREMITIES: No edema.    Lab Results   Component Value Date    TSH 2.581 06/26/2024       Assessment:     1. Encounter for annual routine gynecological examination        2. Encounter for other general counseling or advice on contraception   "      3. Breast cancer screening by mammogram  Mammo Digital Screening Bilat w/ Price (XPD)      4. PMDD (premenstrual dysphoric disorder)  desog-e.estradioL/e.estradioL (KARIVA) 0.15-0.02 mgx21 /0.01 mg x 5 per tablet      5. Overweight (BMI 25.0-29.9)  Ambulatory Referral/Consult to Lifestyle Nutrition      6. Encounter for surveillance of contraceptive pills  desog-e.estradioL/e.estradioL (KARIVA) 0.15-0.02 mgx21 /0.01 mg x 5 per tablet      7. HPV vaccine counseling              Plan:     Clinical breast exam peformed.  Pap UTD.  Mammogram UTD.  DEXA at 65.  Colonoscopy due, patient to call GI.  Contraception: discussed alternative contraception to help alleviate PMDD and menstrual migraines, recommend switch to Mircette and to use JAIMEE continuously.   Weight gain: continue exercise routine, recommend follow up with Nutrition  Follow up in about 1 year (around 7/25/2026) for annual well woman exam or as needed.    Counseling:     Patient was counseled today on current ASCCP pap guidelines, the recommendation for yearly pelvic exams, healthy diet and exercise routines, breast self awareness and annual mammograms. She is to see her PCP for other health maintenance.       Use of the [facility] Patient Portal discussed and encouraged during today's visit.         Nohemi Pena MD  [facility]  07/25/2025           [1]   Patient Active Problem List  Diagnosis    Crohn's disease    Drug-induced immunodeficiency    Chronic left-sided low back pain without sciatica    Mechanical low back pain    Decreased strength, endurance, and mobility    Abnormal weight gain    Body mass index (BMI) 30.0-30.9, adult    History of pancreatitis    Migraine headache    Mixed anxiety and depressive disorder

## 2025-07-29 ENCOUNTER — CLINICAL SUPPORT (OUTPATIENT)
Dept: REHABILITATION | Facility: OTHER | Age: 45
End: 2025-07-29
Payer: COMMERCIAL

## 2025-07-29 DIAGNOSIS — M54.59 MECHANICAL LOW BACK PAIN: Primary | ICD-10-CM

## 2025-07-29 DIAGNOSIS — Z74.09 DECREASED STRENGTH, ENDURANCE, AND MOBILITY: ICD-10-CM

## 2025-07-29 DIAGNOSIS — R53.1 DECREASED STRENGTH, ENDURANCE, AND MOBILITY: ICD-10-CM

## 2025-07-29 DIAGNOSIS — R68.89 DECREASED STRENGTH, ENDURANCE, AND MOBILITY: ICD-10-CM

## 2025-07-29 PROCEDURE — 97112 NEUROMUSCULAR REEDUCATION: CPT | Mod: PN

## 2025-07-29 PROCEDURE — 97530 THERAPEUTIC ACTIVITIES: CPT | Mod: PN

## 2025-07-29 NOTE — PROGRESS NOTES
[facility]   Physical Therapy Treatment Note      Name: Lynda Reveles  Clinic Number: [id number]    Therapy Diagnosis:   Encounter Diagnoses   Name Primary?    Mechanical low back pain Yes    Decreased strength, endurance, and mobility        Physician: Mery Lagos MD    Visit Date: 7/29/2025    Physician Orders: PT Eval and Treat <> PT L spine (Diagnosis: chronic left side low back pain),   Treatment:  - ROM and gentle stretching exercises to restore lumbar motion  - core and hip girdle stabilization exercises  - development of home exercise program     Medical Diagnosis from Referral: M54.50,G89.29 (ICD-10-CM) - Chronic left-sided low back pain without sciatica  Evaluation Date: 7/15/2025  Authorization Period Expiration: 07/01/2026  Plan of Care Expiration: 7/15/2025 - 9/15/2025  Progress Note Due: 8/15/2025  Date of Surgery: n/a  Visit # / Visits authorized: 5/ 1   FOTO: 1/ 3     Precautions: Standard, scoliosis, Chron's      Time In: 3:15p  Time Out: 4:15p  Total Billable Time: 60 minutes    PTA Visit #: 0/5       Subjective     Patient reports: continued intermittent stiffness with prolonged periods of inactivity, such as sitting or sleeping, but says that prolonged walking is better. Can feel the strengthening will be helpful, but knows that progress is slow and recovery of pain will be slow.      She was compliant with home exercise program.  Response to previous treatment: good  Functional change: ongoing    Pain: 5/10  Location: left low back    Objective      Objective Measures updated at progress report unless specified.     Treatment     Lynda received the treatments listed below:      therapeutic exercises to develop strength, endurance, ROM, flexibility, posture, and core stabilization for 00 minutes including:  None today     manual therapy techniques: Joint mobilizations, Manual traction, Myofacial release, and Soft tissue Mobilization were applied to the: LSP for 00  "minutes, including:  SIJ manip prone x2, SL x2  MET to LSP in R SL x5x5"    neuromuscular re-education activities to improve: Balance, Coordination, Kinesthetic, Sense, Proprioception, and Posture for 30 minutes. The following activities were included:  mod side plank w/ hip abd x3x10 B  bridge with cook band pull down x3x10 x orange band + ball squeeze at knees  +90-90 TA series   Iso old w/ press down [id number]" holds   Alt arm lifts x2x10   Alt leg marches x2x10  +LLB red PB x 1x8 -- attempted but UA w/o increased LBP    +Qped birddogs [id number]" holds      therapeutic activities to improve functional performance for 30 minutes, including:  DL squat with OH Y hold x3x8 x YTB  Side stepping x 2 x 60 ft x red loop at forefoot, arms flexed w/ 3# DB  Step up w/ contralat pull down x2x15 B x 7# on freemotion, 8" step  anti rotations x3x10 B x orange power band  +Dynamic carries <> 2x40 ft ea   Benitez x 20# (2) KB   Suitcase x 20# (1) KB   Goblet  x 20# (1) KB    Patient Education and Home Exercises       Education provided:   - 7/22/2025 - added side plank with clam to HEP - 2x20 B <> AROM and then progress resistance    Written Home Exercises Provided: Pt instructed to continue prior HEP. Exercises were reviewed and Lynda was able to demonstrate them prior to the end of the session.  Lynda demonstrated good  understanding of the education provided. See Electronic Medical Record under Patient Instructions for exercises provided during therapy sessions    Assessment     Attempted to progress back, abdominal, and pelvic strength, trunk stability with resisted carries and hip extension-based activities. Good tolerance but quick to fatigue with visible mm juddering with 90-90 core activities. Increased focus to maintain TA with resisted carries but able to tolerance w/o increased sx.  Lynda Is progressing well towards her goals.   Patient prognosis is Good.     Patient will continue to benefit from skilled outpatient physical " therapy to address the deficits listed in the problem list box on initial evaluation, provide pt/family education and to maximize pt's level of independence in the home and community environment.     Patient's spiritual, cultural and educational needs considered and pt agreeable to plan of care and goals.     Anticipated barriers to physical therapy: standard    Goals:   Short Term Goals (4 Weeks):   1. Pt will report 20% reduction in pain of the lumbar spine and LE for ease with ADL's  2. PT will demonstrate improved trunk strength by a half grade in for ease with upright posture during standing activities.  3. Pt will demonstrate improved lumbar spine ROM in all directions by a half grade for ease with bending activities.         Long Term Goals (8 Weeks):   1. Pt will report being independent with HEP for maintenance of improvements gained during therapy sessions  2. PT will report 50% reduction of pain of the back and LE for ease with dressing and grooming activities.   3. Pt will demonstrate trunk and extremity strength to >=4+/5 without the provocation of pain for ease with household chores  4. Pt will demonstrate appropriate upright posture without external cueing for ease with work related activities.   Plan     Cont with POC for pelvic, trunk, core strength, motor control/coordination.  POC ends 9/15/2025    Rama Osullivan, PT

## 2025-07-31 ENCOUNTER — CLINICAL SUPPORT (OUTPATIENT)
Dept: REHABILITATION | Facility: OTHER | Age: 45
End: 2025-07-31
Payer: COMMERCIAL

## 2025-07-31 DIAGNOSIS — R53.1 DECREASED STRENGTH, ENDURANCE, AND MOBILITY: ICD-10-CM

## 2025-07-31 DIAGNOSIS — R68.89 DECREASED STRENGTH, ENDURANCE, AND MOBILITY: ICD-10-CM

## 2025-07-31 DIAGNOSIS — M54.59 MECHANICAL LOW BACK PAIN: Primary | ICD-10-CM

## 2025-07-31 DIAGNOSIS — Z74.09 DECREASED STRENGTH, ENDURANCE, AND MOBILITY: ICD-10-CM

## 2025-07-31 PROCEDURE — 97530 THERAPEUTIC ACTIVITIES: CPT | Mod: PN

## 2025-07-31 PROCEDURE — 97140 MANUAL THERAPY 1/> REGIONS: CPT | Mod: PN

## 2025-07-31 PROCEDURE — 97112 NEUROMUSCULAR REEDUCATION: CPT | Mod: PN

## 2025-07-31 NOTE — PROGRESS NOTES
[facility]   Physical Therapy Treatment Note      Name: Lynda Reveles  Clinic Number: [id number]    Therapy Diagnosis:   Encounter Diagnoses   Name Primary?    Mechanical low back pain Yes    Decreased strength, endurance, and mobility        Physician: Mery Lagos MD    Visit Date: 7/31/2025    Physician Orders: PT Eval and Treat <> PT L spine (Diagnosis: chronic left side low back pain),   Treatment:  - ROM and gentle stretching exercises to restore lumbar motion  - core and hip girdle stabilization exercises  - development of home exercise program     Medical Diagnosis from Referral: M54.50,G89.29 (ICD-10-CM) - Chronic left-sided low back pain without sciatica  Evaluation Date: 7/15/2025  Authorization Period Expiration: 07/01/2026  Plan of Care Expiration: 7/15/2025 - 9/15/2025  Progress Note Due: 8/15/2025  Date of Surgery: n/a  Visit # / Visits authorized: 5/ 1   FOTO: 1/ 3     Precautions: Standard, scoliosis, Chron's      Time In: 3:15p  Time Out: 4:15p  Total Billable Time: 60 minutes    PTA Visit #: 0/5       Subjective     Patient reports: continued intermittent stiffness with prolonged periods of inactivity, such as sitting or sleeping, but says that prolonged walking is better. Can feel the strengthening will be helpful, but knows that progress is slow and recovery of pain will be slow.      She was compliant with home exercise program.  Response to previous treatment: good  Functional change: ongoing    Pain: 5/10  Location: left low back    Objective      Objective Measures updated at progress report unless specified.     7/31/2025    Selective Functional Movement Assessment:   Central Left Right   Active Cervical Flexion FN --- ---   Active Cervical Extension FN --- ---   Cervical Rotation Bend --- FN FN   Upper Extremity Pattern 1 (IR) --- FN DN   Upper Extremity Pattern 2 (ER) --- FN DN   Multisegmental Flexion DP - increased hip hinging, pain at 30 deg flex, with  "poor reversal of lumbar curve, mild rib hump L; no change w. HS unweighted ...Able to perform QPed cat/cow with good LSP mobility with mild pain during trunk flex, slight pinch with ext --- ---   Multisegmental Extension DP - fulcrum at lower LSP, painful --- ---   Multisegmental Rotation --- FP - standing; seated FN with 25% active, full passive OP FP - standing; seated FN with 25% active, full passive OP   Single Leg Stance --- FN FN   Overhead Deep Squat DN - toe out (R), WS to LLE, good hip/knee flex, limited trunk ext --- ---     Abbreviations:  FN - functional non painful  FP - functional painful  DP - dysfunctional painful  DN - dysfunctional non painful    Consider assessing rolling patterns nv.    Special tests:  DL lowering: dysfunctional, painful  SL lowering: improved motor control/TA activation but pain at endrange  Reverse hyperextensions: 30" with increased back pain (L)    Treatment     Lynda received the treatments listed below:      therapeutic exercises to develop strength, endurance, ROM, flexibility, posture, and core stabilization for 00 minutes including:  None today     manual therapy techniques: Joint mobilizations, Manual traction, Myofacial release, and Soft tissue Mobilization were applied to the: LSP for 15 minutes, includin min x dry needling - see note by Rama Danielson, PT  Gr V LSP rotations B  SIJ manip prone x2, SL x2  MET to LSP in R SL x5x5"    neuromuscular re-education activities to improve: Balance, Coordination, Kinesthetic, Sense, Proprioception, and Posture for 20 minutes. The following activities were included:  mod side plank w/ hip abd x3x10 B  bridge with cook band pull down x3x10 x orange band + ball squeeze at knees  +90-90 TA series   Iso old w/ press down [id number]" holds   Alt arm lifts x2x10   Alt leg marches x2x10  +LLB red PB x 1x8 -- attempted but UA w/o increased LBP  +Qped birddogs [id number]" holds      therapeutic activities to improve functional " "performance for 25 minutes, including:  Reassessment  Edu on purpose, benefits, s/e of dry needling  DL squat with OH Y hold x3x8 x YTB  Side stepping x 2 x 60 ft x red loop at forefoot, arms flexed w/ 3# DB  Step up w/ contralat pull down x2x15 B x 7# on freemotion, 8" step  anti rotations x3x10 B x orange power band  +Dynamic carries <> 2x40 ft ea   Benitez x 20# (2) KB   Suitcase x 20# (1) KB   Goblet  x 20# (1) KB    Patient Education and Home Exercises       Education provided:   - 7/22/2025 - added side plank with clam to HEP - 2x20 B <> AROM and then progress resistance    Written Home Exercises Provided: Pt instructed to continue prior HEP. Exercises were reviewed and Lynda was able to demonstrate them prior to the end of the session.  Lynda demonstrated good  understanding of the education provided. See Electronic Medical Record under Patient Instructions for exercises provided during therapy sessions    Assessment     Reassessment performed as pt presents with continued pain with forward bending and transitional movements. Pt with s/s of motor control/stability deficits with SFMA performed today, and pt with good LSP mobility w/ Qped cat/cow movements. Tight mm EF with LSP jt mobs indicating tight mm's that might be contributing to pain. Special testing revealed continued trunk extensor weakness, and abdominal weakness with higher level activities, which might also contribute to LSP instability and c/o pain. Initiated dry needling to determine of lower LSP tension might improve with deep tissue mobilization. Pt with good improvements in soft tissue quality by end of session, but with only slight improvement in trunk AROM afterwards. Edu on s/s from testing and the need for continued isolated trunk strength and stabilization program, especially as scoliotic curve might influence core mm activation with self training at home.  Lynda Is progressing well towards her goals.   Patient prognosis is Good.     Patient " will continue to benefit from skilled outpatient physical therapy to address the deficits listed in the problem list box on initial evaluation, provide pt/family education and to maximize pt's level of independence in the home and community environment.     Patient's spiritual, cultural and educational needs considered and pt agreeable to plan of care and goals.     Anticipated barriers to physical therapy: standard    Goals:   Short Term Goals (4 Weeks):   1. Pt will report 20% reduction in pain of the lumbar spine and LE for ease with ADL's  2. PT will demonstrate improved trunk strength by a half grade in for ease with upright posture during standing activities.  3. Pt will demonstrate improved lumbar spine ROM in all directions by a half grade for ease with bending activities.         Long Term Goals (8 Weeks):   1. Pt will report being independent with HEP for maintenance of improvements gained during therapy sessions  2. PT will report 50% reduction of pain of the back and LE for ease with dressing and grooming activities.   3. Pt will demonstrate trunk and extremity strength to >=4+/5 without the provocation of pain for ease with household chores  4. Pt will demonstrate appropriate upright posture without external cueing for ease with work related activities.   Plan     Cont with POC for pelvic, trunk, core strength, motor control/coordination.  POC ends 9/15/2025    Rama Osullivan, PT

## 2025-08-04 NOTE — PROGRESS NOTES
Physical Therapy Functional Dry Needling Note      Date:  7/31/2025    Name: Lynda Reveles  Clinic Number: [id number]    Therapy Diagnosis:   Encounter Diagnoses   Name Primary?    Mechanical low back pain Yes    Decreased strength, endurance, and mobility      Physician: Mery Lagos MD    Total Time:  12    Subjective      Pt reports: continued L low back pain with transitional movements, including forward bending.  See note by Rama Osullivan, PT     Pain: 5/10  Location: left low back      Objective      See EMR under MEDIA for written consent provided, signed, dated on 7/31/2025    Palpation Assessment to determine the necessity for Functional Dry Needling  -- see assessment on 7/31/2025.     Lynda received the following manual therapy techniques: dry needling to LSP with 2-3 in needles with no adverse effects.    Homeostatic points:  1. Deep Radial  2. Greater Auricular  3. Spinal Accessory  4. Saphenous  5. Deep Fibular  6. Tibial  7. Greater Occipital  8. Suprascapular ( infraspinatus)  9. Lateral Antebrachial Cutaneous  10. Sural  11. Lateral Popliteal  12. Superficial Radial  13. Dorsal Scapular  14. Superior Cluneal  15. Posterior Cutaneous L 2  16. Inferior Gluteal  17. Lateral Pectoral  18. Ilitotibal  19. Infraorbital  20. Spinous process T7  21. Posterior cutaneous  T6  22. Posterior cutaneous L 5  23. Supraorbital  24. Common fibular    Paravertebral Points:  L2-4 paraspinals B    Symptomatic Points:   L3-5 multifidi B  B QL (mid mm belly x1, attachment at ILL x1)     Assessment      Patient demonstrated appropriate response to Functional Dry Needling. Twitch response with L lumbar multidi > QL. Size 50 needles used on R due to rotation in scoliotic curve, with size 60 needles on L due to change in depth. Intramuscular estim used at 2 Hz, level 4.0 for 10 minutes, x 2 channels. good  rhythmical contractions observed with estim to treated muscle groups. Good improvements in soft tissue quality  "bilaterally by end of session, but min change in trunk AROM in standing. This is possibly due to strength and motor control/stability deficits as contributing to c/c vs true mm and jt stiffness as limiting impairments.      Home Exercises and Patient Education      Education provided:   Purpose, benefits, and potential side effects of dry needling.   Educated pt to use heat following treatment sessions to reduce c/o pain or soreness and to improve circulation to needled sites.   Encouraged pt to continue with HEP to maintain flexibility, ROM, and functional mobility.    Written Handout on response to FDN provided: none provided today.    Lynda demonstrated good understanding of the education provided.     See EMR under "Patient Instructions" for exercises provided.    Plan      Monitor response to Functional Dry Needling. Continue with Functional Dry Needling in POC as appropriate.       Rama Osullivan, PT  8/4/2025            "

## 2025-08-08 ENCOUNTER — CLINICAL SUPPORT (OUTPATIENT)
Dept: REHABILITATION | Facility: OTHER | Age: 45
End: 2025-08-08
Payer: COMMERCIAL

## 2025-08-08 DIAGNOSIS — Z74.09 DECREASED STRENGTH, ENDURANCE, AND MOBILITY: ICD-10-CM

## 2025-08-08 DIAGNOSIS — M54.59 MECHANICAL LOW BACK PAIN: Primary | ICD-10-CM

## 2025-08-08 DIAGNOSIS — R53.1 DECREASED STRENGTH, ENDURANCE, AND MOBILITY: ICD-10-CM

## 2025-08-08 DIAGNOSIS — R68.89 DECREASED STRENGTH, ENDURANCE, AND MOBILITY: ICD-10-CM

## 2025-08-08 PROCEDURE — 97112 NEUROMUSCULAR REEDUCATION: CPT | Mod: PN

## 2025-08-08 PROCEDURE — 97530 THERAPEUTIC ACTIVITIES: CPT | Mod: PN

## 2025-08-14 ENCOUNTER — CLINICAL SUPPORT (OUTPATIENT)
Dept: REHABILITATION | Facility: OTHER | Age: 45
End: 2025-08-14
Payer: COMMERCIAL

## 2025-08-14 DIAGNOSIS — M54.59 MECHANICAL LOW BACK PAIN: Primary | ICD-10-CM

## 2025-08-14 DIAGNOSIS — R68.89 DECREASED STRENGTH, ENDURANCE, AND MOBILITY: ICD-10-CM

## 2025-08-14 DIAGNOSIS — Z74.09 DECREASED STRENGTH, ENDURANCE, AND MOBILITY: ICD-10-CM

## 2025-08-14 DIAGNOSIS — R53.1 DECREASED STRENGTH, ENDURANCE, AND MOBILITY: ICD-10-CM

## 2025-08-14 PROCEDURE — 97112 NEUROMUSCULAR REEDUCATION: CPT | Mod: PN

## 2025-08-14 PROCEDURE — 97530 THERAPEUTIC ACTIVITIES: CPT | Mod: PN

## 2025-08-18 PROBLEM — G89.29 CHRONIC LEFT-SIDED LOW BACK PAIN WITHOUT SCIATICA: Status: RESOLVED | Noted: 2025-07-01 | Resolved: 2025-08-18

## 2025-08-18 PROBLEM — M54.50 CHRONIC LEFT-SIDED LOW BACK PAIN WITHOUT SCIATICA: Status: RESOLVED | Noted: 2025-07-01 | Resolved: 2025-08-18

## 2025-08-19 ENCOUNTER — CLINICAL SUPPORT (OUTPATIENT)
Dept: REHABILITATION | Facility: OTHER | Age: 45
End: 2025-08-19
Payer: COMMERCIAL

## 2025-08-19 DIAGNOSIS — M54.59 MECHANICAL LOW BACK PAIN: Primary | ICD-10-CM

## 2025-08-19 DIAGNOSIS — R53.1 DECREASED STRENGTH, ENDURANCE, AND MOBILITY: ICD-10-CM

## 2025-08-19 DIAGNOSIS — Z74.09 DECREASED STRENGTH, ENDURANCE, AND MOBILITY: ICD-10-CM

## 2025-08-19 DIAGNOSIS — R68.89 DECREASED STRENGTH, ENDURANCE, AND MOBILITY: ICD-10-CM

## 2025-08-19 PROCEDURE — 97530 THERAPEUTIC ACTIVITIES: CPT | Mod: PN

## 2025-08-19 PROCEDURE — 97112 NEUROMUSCULAR REEDUCATION: CPT | Mod: PN

## 2025-08-26 ENCOUNTER — PATIENT MESSAGE (OUTPATIENT)
Dept: REHABILITATION | Facility: OTHER | Age: 45
End: 2025-08-26
Payer: COMMERCIAL

## 2025-09-01 PROBLEM — Z74.09 DECREASED STRENGTH, ENDURANCE, AND MOBILITY: Status: RESOLVED | Noted: 2025-07-21 | Resolved: 2025-09-01

## 2025-09-01 PROBLEM — R68.89 DECREASED STRENGTH, ENDURANCE, AND MOBILITY: Status: RESOLVED | Noted: 2025-07-21 | Resolved: 2025-09-01

## 2025-09-01 PROBLEM — R53.1 DECREASED STRENGTH, ENDURANCE, AND MOBILITY: Status: RESOLVED | Noted: 2025-07-21 | Resolved: 2025-09-01
